# Patient Record
Sex: MALE | Race: WHITE | NOT HISPANIC OR LATINO | Employment: OTHER | URBAN - METROPOLITAN AREA
[De-identification: names, ages, dates, MRNs, and addresses within clinical notes are randomized per-mention and may not be internally consistent; named-entity substitution may affect disease eponyms.]

---

## 2017-01-31 ENCOUNTER — OFFICE VISIT (OUTPATIENT)
Dept: INTERNAL MEDICINE | Facility: CLINIC | Age: 62
End: 2017-01-31

## 2017-01-31 VITALS
HEIGHT: 72 IN | OXYGEN SATURATION: 98 % | WEIGHT: 198.8 LBS | SYSTOLIC BLOOD PRESSURE: 106 MMHG | DIASTOLIC BLOOD PRESSURE: 60 MMHG | RESPIRATION RATE: 12 BRPM | BODY MASS INDEX: 26.93 KG/M2 | HEART RATE: 60 BPM | TEMPERATURE: 98.6 F

## 2017-01-31 DIAGNOSIS — J30.9 ATOPIC RHINITIS: Primary | ICD-10-CM

## 2017-01-31 DIAGNOSIS — M72.2 PLANTAR FASCIITIS: ICD-10-CM

## 2017-01-31 DIAGNOSIS — J06.9 VIRAL URI: ICD-10-CM

## 2017-01-31 DIAGNOSIS — E66.9 ADIPOSITY: ICD-10-CM

## 2017-01-31 PROCEDURE — 99214 OFFICE O/P EST MOD 30 MIN: CPT | Performed by: INTERNAL MEDICINE

## 2017-01-31 PROCEDURE — G0439 PPPS, SUBSEQ VISIT: HCPCS | Performed by: INTERNAL MEDICINE

## 2017-01-31 RX ORDER — FLUTICASONE PROPIONATE 50 MCG
2 SPRAY, SUSPENSION (ML) NASAL DAILY
Qty: 3 BOTTLE | Refills: 3 | Status: SHIPPED | OUTPATIENT
Start: 2017-01-31 | End: 2017-10-26

## 2017-02-03 NOTE — PROGRESS NOTES
"Subjective   Otis Loredo is a 62 y.o. male.     History of Present Illness     The patient has a lifelong history of seasonal allergic rhinitis.  He has pink symptoms and the fall and uses Flonase on a regular basis.  He has had no recent cough or wheezing.  He does work on environmental control and feels he has much less mold and dust exposure.  He has frequent sinus headaches but has had no fever or sore throat.    The patient has a lifelong history of overweight and obesity.  Both parents were markedly obese.  He lost 70 pounds between 2011 2012 6 months ago he was still at 226 pounds.  He has wife are working with the dietitian to resolve high calorie foods at home.  He feels they've made excellent progress.  He is walking on a regular basis with new shoe inserts.    The following portions of the patient's history were reviewed and updated as appropriate: allergies, current medications, past family history, past medical history, past social history, past surgical history and problem list.    Review of Systems   Constitutional: Negative for appetite change and fatigue.   HENT: Positive for congestion and sinus pressure. Negative for ear pain and sore throat.    Respiratory: Negative for cough and shortness of breath.    Cardiovascular: Negative for chest pain and palpitations.   Gastrointestinal: Negative for abdominal pain and nausea.   Musculoskeletal: Positive for gait problem. Negative for arthralgias and back pain.        Persistent pain in bottom of feet.  Heal pain much improved with inserts   Neurological: Negative for dizziness and headaches.       Objective   Blood pressure 106/60, pulse 60, temperature 98.6 °F (37 °C), temperature source Oral, resp. rate 12, height 71.5\" (181.6 cm), weight 198 lb 12.8 oz (90.2 kg), SpO2 98 %.    Physical Exam   Constitutional: He is oriented to person, place, and time. He appears well-developed and well-nourished.   HENT:   Right Ear: External ear normal.   Left " Ear: External ear normal.   Mouth/Throat: Oropharynx is clear and moist.   Mild maxillary tenderness   Cardiovascular: Normal rate, regular rhythm and normal heart sounds.    Pulmonary/Chest: Effort normal and breath sounds normal. He has no wheezes. He has no rales.   Musculoskeletal: Normal range of motion. He exhibits no edema.   Moderate plantar fasciitis with flatfeet   Neurological: He is alert and oriented to person, place, and time. He exhibits normal muscle tone. Coordination normal.   Psychiatric: He has a normal mood and affect. His behavior is normal. Judgment and thought content normal.   Nursing note and vitals reviewed.    Procedures  Assessment/Plan   Diagnoses and all orders for this visit:    Atopic rhinitis    Adiposity    Plantar fasciitis    Viral URI    Other orders  -     fluticasone (FLONASE) 50 MCG/ACT nasal spray; 2 sprays into each nostril Daily.      The patient's obesity has significantly improved.  He has a 28 pound weight loss in the last 7 months.  I've asked him to plan on another 10 pound weight loss this year.    The patient's allergic rhinitis is reasonably well controlled on Flonase.  He should use nasal saline when he is more symptomatic.    The patient's had a moderate tobacco addiction throughout his life.  He is been off of tobacco now for 3 years using the electronic cigarette.  He is continually using low doses of nicotine.    The wellness exam has been reviewed in detail.  The patient has been fully counseled on preventative guidelines for vaccines, cancer screenings, and other health maintenance needs.  Functional testing has been performed to assess capacity for independent living and need for other medical interventions.   The patient was counseled on maintaining a lifestyle to promote good health and to minimize chronic diseases.  The patient has been assisted with scheduling healthcare procedures for the coming year and given a written document outlining these  recommendations.    Patient Instructions   1.  Continue same medications and supplements - as listed.    2.  Use plain Mucinex twice daily for 1 week - to clear congestion.    3.  Use some nasal saline every morning - to clear nasal passages.    4.  Continue usual medicines and supplements - as listed.    5.  Follow a low-calorie diet - for steady weight loss.    6.  One year goal weight - 185 pounds.    7.  Return in 4 months - annual checkup fasting.    Electronically signed Randall Tipton M.D.2/2/2017 7:05 PM

## 2017-05-25 ENCOUNTER — APPOINTMENT (OUTPATIENT)
Dept: LAB | Facility: HOSPITAL | Age: 62
End: 2017-05-25

## 2017-05-25 ENCOUNTER — OFFICE VISIT (OUTPATIENT)
Dept: INTERNAL MEDICINE | Facility: CLINIC | Age: 62
End: 2017-05-25

## 2017-05-25 VITALS
DIASTOLIC BLOOD PRESSURE: 70 MMHG | RESPIRATION RATE: 16 BRPM | HEIGHT: 71 IN | WEIGHT: 211 LBS | SYSTOLIC BLOOD PRESSURE: 106 MMHG | BODY MASS INDEX: 29.54 KG/M2 | HEART RATE: 64 BPM | TEMPERATURE: 98.1 F | OXYGEN SATURATION: 98 %

## 2017-05-25 DIAGNOSIS — M72.2 PLANTAR FASCIITIS: ICD-10-CM

## 2017-05-25 DIAGNOSIS — N42.9 DISORDER OF PROSTATE: ICD-10-CM

## 2017-05-25 DIAGNOSIS — M21.42 PES PLANUS OF BOTH FEET: ICD-10-CM

## 2017-05-25 DIAGNOSIS — K64.0 FIRST DEGREE HEMORRHOIDS: Primary | ICD-10-CM

## 2017-05-25 DIAGNOSIS — Z82.49 FAM HX-ISCHEM HEART DISEASE: ICD-10-CM

## 2017-05-25 DIAGNOSIS — J30.9 ATOPIC RHINITIS: ICD-10-CM

## 2017-05-25 DIAGNOSIS — E66.9 ADIPOSITY: ICD-10-CM

## 2017-05-25 DIAGNOSIS — H93.13 TINNITUS OF BOTH EARS: ICD-10-CM

## 2017-05-25 DIAGNOSIS — M21.41 PES PLANUS OF BOTH FEET: ICD-10-CM

## 2017-05-25 LAB
ALBUMIN SERPL-MCNC: 4.3 G/DL (ref 3.2–4.8)
ALBUMIN/GLOB SERPL: 1.5 G/DL (ref 1.5–2.5)
ALP SERPL-CCNC: 77 U/L (ref 25–100)
ALT SERPL W P-5'-P-CCNC: 16 U/L (ref 7–40)
ANION GAP SERPL CALCULATED.3IONS-SCNC: 1 MMOL/L (ref 3–11)
AST SERPL-CCNC: 27 U/L (ref 0–33)
BACTERIA UR QL AUTO: ABNORMAL /HPF
BASOPHILS # BLD AUTO: 0.02 10*3/MM3 (ref 0–0.2)
BASOPHILS NFR BLD AUTO: 0.4 % (ref 0–1)
BILIRUB SERPL-MCNC: 0.6 MG/DL (ref 0.3–1.2)
BILIRUB UR QL STRIP: NEGATIVE
BUN BLD-MCNC: 8 MG/DL (ref 9–23)
BUN/CREAT SERPL: 10 (ref 7–25)
CALCIUM SPEC-SCNC: 9.1 MG/DL (ref 8.7–10.4)
CHLORIDE SERPL-SCNC: 107 MMOL/L (ref 99–109)
CLARITY UR: CLEAR
CO2 SERPL-SCNC: 33 MMOL/L (ref 20–31)
COLOR UR: YELLOW
CREAT BLD-MCNC: 0.8 MG/DL (ref 0.6–1.3)
CRP SERPL-MCNC: 0.07 MG/DL (ref 0–1)
DEPRECATED RDW RBC AUTO: 46.9 FL (ref 37–54)
EOSINOPHIL # BLD AUTO: 0.18 10*3/MM3 (ref 0.1–0.3)
EOSINOPHIL NFR BLD AUTO: 3.9 % (ref 0–3)
ERYTHROCYTE [DISTWIDTH] IN BLOOD BY AUTOMATED COUNT: 12.9 % (ref 11.3–14.5)
GFR SERPL CREATININE-BSD FRML MDRD: 99 ML/MIN/1.73
GLOBULIN UR ELPH-MCNC: 2.8 GM/DL
GLUCOSE BLD-MCNC: 95 MG/DL (ref 70–100)
GLUCOSE UR STRIP-MCNC: NEGATIVE MG/DL
HCT VFR BLD AUTO: 41.3 % (ref 38.9–50.9)
HGB BLD-MCNC: 14.1 G/DL (ref 13.1–17.5)
HGB UR QL STRIP.AUTO: NEGATIVE
HYALINE CASTS UR QL AUTO: ABNORMAL /LPF
IMM GRANULOCYTES # BLD: 0.01 10*3/MM3 (ref 0–0.03)
IMM GRANULOCYTES NFR BLD: 0.2 % (ref 0–0.6)
KETONES UR QL STRIP: NEGATIVE
LEUKOCYTE ESTERASE UR QL STRIP.AUTO: ABNORMAL
LYMPHOCYTES # BLD AUTO: 2 10*3/MM3 (ref 0.6–4.8)
LYMPHOCYTES NFR BLD AUTO: 43.9 % (ref 24–44)
MCH RBC QN AUTO: 33.7 PG (ref 27–31)
MCHC RBC AUTO-ENTMCNC: 34.1 G/DL (ref 32–36)
MCV RBC AUTO: 98.8 FL (ref 80–99)
MONOCYTES # BLD AUTO: 0.38 10*3/MM3 (ref 0–1)
MONOCYTES NFR BLD AUTO: 8.3 % (ref 0–12)
NEUTROPHILS # BLD AUTO: 1.97 10*3/MM3 (ref 1.5–8.3)
NEUTROPHILS NFR BLD AUTO: 43.3 % (ref 41–71)
NITRITE UR QL STRIP: NEGATIVE
PH UR STRIP.AUTO: 5.5 [PH] (ref 5–8)
PLATELET # BLD AUTO: 255 10*3/MM3 (ref 150–450)
PMV BLD AUTO: 10 FL (ref 6–12)
POTASSIUM BLD-SCNC: 4.2 MMOL/L (ref 3.5–5.5)
PROT SERPL-MCNC: 7.1 G/DL (ref 5.7–8.2)
PROT UR QL STRIP: NEGATIVE
PSA SERPL-MCNC: 2.49 NG/ML (ref 0–4)
RBC # BLD AUTO: 4.18 10*6/MM3 (ref 4.2–5.76)
RBC # UR: ABNORMAL /HPF
REF LAB TEST METHOD: ABNORMAL
SODIUM BLD-SCNC: 141 MMOL/L (ref 132–146)
SP GR UR STRIP: 1.02 (ref 1–1.03)
SQUAMOUS #/AREA URNS HPF: ABNORMAL /HPF
UROBILINOGEN UR QL STRIP: ABNORMAL
WBC NRBC COR # BLD: 4.56 10*3/MM3 (ref 3.5–10.8)
WBC UR QL AUTO: ABNORMAL /HPF

## 2017-05-25 PROCEDURE — 36415 COLL VENOUS BLD VENIPUNCTURE: CPT | Performed by: INTERNAL MEDICINE

## 2017-05-25 PROCEDURE — 86140 C-REACTIVE PROTEIN: CPT | Performed by: INTERNAL MEDICINE

## 2017-05-25 PROCEDURE — 81001 URINALYSIS AUTO W/SCOPE: CPT | Performed by: INTERNAL MEDICINE

## 2017-05-25 PROCEDURE — 84153 ASSAY OF PSA TOTAL: CPT | Performed by: INTERNAL MEDICINE

## 2017-05-25 PROCEDURE — 80053 COMPREHEN METABOLIC PANEL: CPT | Performed by: INTERNAL MEDICINE

## 2017-05-25 PROCEDURE — 93000 ELECTROCARDIOGRAM COMPLETE: CPT | Performed by: INTERNAL MEDICINE

## 2017-05-25 PROCEDURE — 99215 OFFICE O/P EST HI 40 MIN: CPT | Performed by: INTERNAL MEDICINE

## 2017-05-25 PROCEDURE — 85025 COMPLETE CBC W/AUTO DIFF WBC: CPT | Performed by: INTERNAL MEDICINE

## 2017-06-01 ENCOUNTER — TELEPHONE (OUTPATIENT)
Dept: INTERNAL MEDICINE | Facility: CLINIC | Age: 62
End: 2017-06-01

## 2017-06-01 DIAGNOSIS — N42.9 DISORDER OF PROSTATE: Primary | ICD-10-CM

## 2017-06-01 RX ORDER — SULFAMETHOXAZOLE AND TRIMETHOPRIM 800; 160 MG/1; MG/1
1 TABLET ORAL 2 TIMES DAILY
Qty: 20 TABLET | Refills: 0 | Status: SHIPPED | OUTPATIENT
Start: 2017-06-01 | End: 2017-10-26

## 2017-06-01 NOTE — PROGRESS NOTES
The patient has a normal PSA at 2.5.  His last 3 PSAs have been 0.6 or lower.  He does have sick to 12 WBCs in the urine.  Start Bactrim DS every 12 hours for 10 days.  Repeat PSA in 2 weeks.

## 2017-06-01 NOTE — TELEPHONE ENCOUNTER
Pt notified of labs - UA shows 6-12 WBCs in urine; PSA 2.490  (was 0.40); recommend to take Bactrim DS bid X 10 days and repeat PSA in 2 wks; other labs OK per TGF

## 2017-10-26 ENCOUNTER — OFFICE VISIT (OUTPATIENT)
Dept: INTERNAL MEDICINE | Facility: CLINIC | Age: 62
End: 2017-10-26

## 2017-10-26 ENCOUNTER — APPOINTMENT (OUTPATIENT)
Dept: LAB | Facility: HOSPITAL | Age: 62
End: 2017-10-26

## 2017-10-26 VITALS
HEART RATE: 72 BPM | RESPIRATION RATE: 16 BRPM | TEMPERATURE: 98.6 F | BODY MASS INDEX: 31.24 KG/M2 | WEIGHT: 224 LBS | OXYGEN SATURATION: 97 % | SYSTOLIC BLOOD PRESSURE: 110 MMHG | DIASTOLIC BLOOD PRESSURE: 80 MMHG

## 2017-10-26 DIAGNOSIS — N42.9 DISORDER OF PROSTATE: ICD-10-CM

## 2017-10-26 DIAGNOSIS — M54.42 ACUTE BILATERAL LOW BACK PAIN WITH LEFT-SIDED SCIATICA: ICD-10-CM

## 2017-10-26 DIAGNOSIS — J30.89 CHRONIC NON-SEASONAL ALLERGIC RHINITIS, UNSPECIFIED TRIGGER: Primary | ICD-10-CM

## 2017-10-26 DIAGNOSIS — Z00.00 PREVENTATIVE HEALTH CARE: ICD-10-CM

## 2017-10-26 PROBLEM — M54.50 LOW BACK PAIN: Status: ACTIVE | Noted: 2017-10-26

## 2017-10-26 LAB
BILIRUB UR QL STRIP: NEGATIVE
CLARITY UR: CLEAR
COLOR UR: YELLOW
GLUCOSE UR STRIP-MCNC: NEGATIVE MG/DL
HGB UR QL STRIP.AUTO: NEGATIVE
KETONES UR QL STRIP: NEGATIVE
LEUKOCYTE ESTERASE UR QL STRIP.AUTO: NEGATIVE
NITRITE UR QL STRIP: NEGATIVE
PH UR STRIP.AUTO: 6 [PH] (ref 5–8)
PROT UR QL STRIP: NEGATIVE
PSA SERPL-MCNC: 0.51 NG/ML (ref 0–4)
SP GR UR STRIP: 1.01 (ref 1–1.03)
UROBILINOGEN UR QL STRIP: NORMAL

## 2017-10-26 PROCEDURE — 81003 URINALYSIS AUTO W/O SCOPE: CPT | Performed by: INTERNAL MEDICINE

## 2017-10-26 PROCEDURE — 90686 IIV4 VACC NO PRSV 0.5 ML IM: CPT | Performed by: INTERNAL MEDICINE

## 2017-10-26 PROCEDURE — 36415 COLL VENOUS BLD VENIPUNCTURE: CPT | Performed by: INTERNAL MEDICINE

## 2017-10-26 PROCEDURE — 99214 OFFICE O/P EST MOD 30 MIN: CPT | Performed by: INTERNAL MEDICINE

## 2017-10-26 PROCEDURE — 84153 ASSAY OF PSA TOTAL: CPT | Performed by: INTERNAL MEDICINE

## 2017-10-26 PROCEDURE — G0008 ADMIN INFLUENZA VIRUS VAC: HCPCS | Performed by: INTERNAL MEDICINE

## 2017-10-26 RX ORDER — FLUTICASONE PROPIONATE 50 MCG
2 SPRAY, SUSPENSION (ML) NASAL DAILY PRN
Qty: 3 BOTTLE | Refills: 3 | Status: SHIPPED | OUTPATIENT
Start: 2017-10-26 | End: 2018-06-09 | Stop reason: SDUPTHER

## 2017-10-26 NOTE — PROGRESS NOTES
Subjective   Otis Loredo is a 62 y.o. male.     History of Present Illness     The patient has many years of intermittent low back pain.  He has been more cautious stool for excessive standing and lifting in recent years.  Over the last 2 weeks he has had a marked increase in left low backache with left sciatica.  He has been standing more than usual working at his [employer].  He has taken no medications.  He has used an over-the-counter patch with limited benefit.    The following portions of the patient's history were reviewed and updated as appropriate: allergies, current medications, past family history, past medical history, past social history, past surgical history and problem list.    Review of Systems   Constitutional: Negative for appetite change and fatigue.   HENT: Negative for ear pain and sore throat.    Respiratory: Negative for cough and shortness of breath.    Cardiovascular: Negative for chest pain and palpitations.   Gastrointestinal: Negative for abdominal pain and nausea.   Genitourinary: Negative for dysuria and hematuria.   Musculoskeletal: Positive for back pain and gait problem. Negative for arthralgias.        Sciatica and feet pain.   Neurological: Negative for dizziness and headaches.       Objective   Blood pressure 110/80, pulse 72, temperature 98.6 °F (37 °C), temperature source Oral, resp. rate 16, weight 224 lb (102 kg), SpO2 97 %.    Physical Exam   Constitutional: He is oriented to person, place, and time. He appears well-developed and well-nourished. No distress.   Cardiovascular: Normal rate, regular rhythm and normal heart sounds.    Pulmonary/Chest: Effort normal and breath sounds normal. He has no wheezes. He has no rales.   Musculoskeletal:   Moderate paralumbar tightness tenderness.  Hips and knees good range of motion with moderate stiffness.  Straight leg raises 60° bilaterally with no back pain.  Gait is within normal limits.   Neurological: He is alert and oriented to  person, place, and time. He exhibits normal muscle tone. Coordination normal.   Psychiatric: He has a normal mood and affect. His behavior is normal. Judgment and thought content normal.   Nursing note and vitals reviewed.    Procedures  Assessment/Plan   Otis was seen today for back pain.    Diagnoses and all orders for this visit:    Chronic non-seasonal allergic rhinitis, unspecified trigger    Preventative health care    Acute bilateral low back pain with left-sided sciatica  -     XR Spine Lumbar 2 or 3 View    Disorder of prostate  -     PSA  -     Urinalysis With / Culture If Indicated - Urine, Clean Catch    Other orders  -     Flu Vaccine Quad PF 3YR+ (FLUARIX/FLUZONE [id number])  -     fluticasone (FLONASE) 50 MCG/ACT nasal spray; 2 sprays into each nostril Daily As Needed.    The patient's low back x-ray is pending at the time of this dictation.  He appears to have a chronic spondylosis and has likely fatigue is back from excess standing.  I've described back rehabilitation exercises and asked him to be consistent every day.     Patient's PSA elevated this spring to 2.4 from a previous PSA of 0.4.  The PSA is again dropped to less than 1.  His urinalysis is normal.  He likely had transient prostatitis.    Patient has chronic allergic rhinitis and moderate congestion today.  I've counseled him on routine use of nasal saline and Flonase.    Patient Instructions   1.  X-ray of low back - now.    2.  Low back exercises morning and night - over the next month - call the office with status report.    3.  Avoid excessive bending and standing - protect the back.    4.  Return visit January - fasting checkup.    5.  Urinalysis and PSA are fully normal.    Electronically signed Randall Tipton M.D.10/28/2017 3:19 PM

## 2017-10-26 NOTE — PATIENT INSTRUCTIONS
1.  X-ray of low back - now.    2.  Low back exercises morning and night - over the next month - call the office with status report.    3.  Avoid excessive bending and standing - protect the back.    4.  Return visit January - fasting checkup.

## 2017-11-01 ENCOUNTER — TELEPHONE (OUTPATIENT)
Dept: INTERNAL MEDICINE | Facility: CLINIC | Age: 62
End: 2017-11-01

## 2018-05-30 ENCOUNTER — APPOINTMENT (OUTPATIENT)
Dept: LAB | Facility: HOSPITAL | Age: 63
End: 2018-05-30

## 2018-05-30 ENCOUNTER — OFFICE VISIT (OUTPATIENT)
Dept: INTERNAL MEDICINE | Facility: CLINIC | Age: 63
End: 2018-05-30

## 2018-05-30 VITALS
HEIGHT: 72 IN | TEMPERATURE: 97.6 F | HEART RATE: 64 BPM | DIASTOLIC BLOOD PRESSURE: 70 MMHG | WEIGHT: 244 LBS | OXYGEN SATURATION: 97 % | SYSTOLIC BLOOD PRESSURE: 116 MMHG | BODY MASS INDEX: 33.05 KG/M2 | RESPIRATION RATE: 16 BRPM

## 2018-05-30 DIAGNOSIS — Z82.49 FAM HX-ISCHEM HEART DISEASE: ICD-10-CM

## 2018-05-30 DIAGNOSIS — Z11.59 ENCOUNTER FOR HEPATITIS C SCREENING TEST FOR LOW RISK PATIENT: ICD-10-CM

## 2018-05-30 DIAGNOSIS — I49.8 OTHER SPECIFIED CARDIAC ARRHYTHMIAS (CODE): ICD-10-CM

## 2018-05-30 DIAGNOSIS — Z12.5 ENCOUNTER FOR SCREENING FOR MALIGNANT NEOPLASM OF PROSTATE: ICD-10-CM

## 2018-05-30 DIAGNOSIS — N42.9 DISORDER OF PROSTATE: ICD-10-CM

## 2018-05-30 DIAGNOSIS — R00.2 PALPITATIONS: ICD-10-CM

## 2018-05-30 DIAGNOSIS — Z13.21 ENCOUNTER FOR VITAMIN DEFICIENCY SCREENING: ICD-10-CM

## 2018-05-30 DIAGNOSIS — E66.09 CLASS 1 OBESITY DUE TO EXCESS CALORIES WITH SERIOUS COMORBIDITY AND BODY MASS INDEX (BMI) OF 33.0 TO 33.9 IN ADULT: Primary | ICD-10-CM

## 2018-05-30 DIAGNOSIS — E55.9 VITAMIN D DEFICIENCY: ICD-10-CM

## 2018-05-30 DIAGNOSIS — Z00.00 PREVENTATIVE HEALTH CARE: ICD-10-CM

## 2018-05-30 LAB
25(OH)D3 SERPL-MCNC: 44 NG/ML
ALBUMIN SERPL-MCNC: 4.4 G/DL (ref 3.2–4.8)
ALBUMIN/GLOB SERPL: 1.6 G/DL (ref 1.5–2.5)
ALP SERPL-CCNC: 86 U/L (ref 25–100)
ALT SERPL W P-5'-P-CCNC: 16 U/L (ref 7–40)
ANION GAP SERPL CALCULATED.3IONS-SCNC: 2 MMOL/L (ref 3–11)
ARTICHOKE IGE QN: 92 MG/DL (ref 0–130)
AST SERPL-CCNC: 24 U/L (ref 0–33)
BACTERIA UR QL AUTO: ABNORMAL /HPF
BASOPHILS # BLD AUTO: 0.03 10*3/MM3 (ref 0–0.2)
BASOPHILS NFR BLD AUTO: 0.5 % (ref 0–1)
BILIRUB SERPL-MCNC: 0.6 MG/DL (ref 0.3–1.2)
BILIRUB UR QL STRIP: NEGATIVE
BUN BLD-MCNC: 8 MG/DL (ref 9–23)
BUN/CREAT SERPL: 10 (ref 7–25)
CALCIUM SPEC-SCNC: 8.9 MG/DL (ref 8.7–10.4)
CHLORIDE SERPL-SCNC: 111 MMOL/L (ref 99–109)
CHOLEST SERPL-MCNC: 154 MG/DL (ref 0–200)
CLARITY UR: CLEAR
CO2 SERPL-SCNC: 30 MMOL/L (ref 20–31)
COLOR UR: YELLOW
CREAT BLD-MCNC: 0.8 MG/DL (ref 0.6–1.3)
CRP SERPL-MCNC: 0.1 MG/DL (ref 0–1)
DEPRECATED RDW RBC AUTO: 47.2 FL (ref 37–54)
EOSINOPHIL # BLD AUTO: 0.24 10*3/MM3 (ref 0–0.3)
EOSINOPHIL NFR BLD AUTO: 3.8 % (ref 0–3)
ERYTHROCYTE [DISTWIDTH] IN BLOOD BY AUTOMATED COUNT: 13.2 % (ref 11.3–14.5)
GFR SERPL CREATININE-BSD FRML MDRD: 98 ML/MIN/1.73
GLOBULIN UR ELPH-MCNC: 2.7 GM/DL
GLUCOSE BLD-MCNC: 95 MG/DL (ref 70–100)
GLUCOSE UR STRIP-MCNC: NEGATIVE MG/DL
HCT VFR BLD AUTO: 42 % (ref 38.9–50.9)
HCV AB SER DONR QL: NORMAL
HDLC SERPL-MCNC: 51 MG/DL (ref 40–60)
HGB BLD-MCNC: 14 G/DL (ref 13.1–17.5)
HGB UR QL STRIP.AUTO: NEGATIVE
HYALINE CASTS UR QL AUTO: ABNORMAL /LPF
IMM GRANULOCYTES # BLD: 0.01 10*3/MM3 (ref 0–0.03)
IMM GRANULOCYTES NFR BLD: 0.2 % (ref 0–0.6)
KETONES UR QL STRIP: NEGATIVE
LEUKOCYTE ESTERASE UR QL STRIP.AUTO: ABNORMAL
LYMPHOCYTES # BLD AUTO: 2.11 10*3/MM3 (ref 0.6–4.8)
LYMPHOCYTES NFR BLD AUTO: 33.8 % (ref 24–44)
MCH RBC QN AUTO: 32.4 PG (ref 27–31)
MCHC RBC AUTO-ENTMCNC: 33.3 G/DL (ref 32–36)
MCV RBC AUTO: 97.2 FL (ref 80–99)
MONOCYTES # BLD AUTO: 0.56 10*3/MM3 (ref 0–1)
MONOCYTES NFR BLD AUTO: 9 % (ref 0–12)
NEUTROPHILS # BLD AUTO: 3.31 10*3/MM3 (ref 1.5–8.3)
NEUTROPHILS NFR BLD AUTO: 52.9 % (ref 41–71)
NITRITE UR QL STRIP: NEGATIVE
PH UR STRIP.AUTO: <=5 [PH] (ref 5–8)
PLATELET # BLD AUTO: 242 10*3/MM3 (ref 150–450)
PMV BLD AUTO: 10.8 FL (ref 6–12)
POTASSIUM BLD-SCNC: 4.3 MMOL/L (ref 3.5–5.5)
PROT SERPL-MCNC: 7.1 G/DL (ref 5.7–8.2)
PROT UR QL STRIP: NEGATIVE
PSA SERPL-MCNC: 1.31 NG/ML (ref 0–4)
RBC # BLD AUTO: 4.32 10*6/MM3 (ref 4.2–5.76)
RBC # UR: ABNORMAL /HPF
REF LAB TEST METHOD: ABNORMAL
SODIUM BLD-SCNC: 143 MMOL/L (ref 132–146)
SP GR UR STRIP: 1.02 (ref 1–1.03)
SQUAMOUS #/AREA URNS HPF: ABNORMAL /HPF
TRIGL SERPL-MCNC: 92 MG/DL (ref 0–150)
TSH SERPL DL<=0.05 MIU/L-ACNC: 1.23 MIU/ML (ref 0.35–5.35)
UROBILINOGEN UR QL STRIP: ABNORMAL
VIT B12 BLD-MCNC: 394 PG/ML (ref 211–911)
WBC NRBC COR # BLD: 6.25 10*3/MM3 (ref 3.5–10.8)
WBC UR QL AUTO: ABNORMAL /HPF

## 2018-05-30 PROCEDURE — 80053 COMPREHEN METABOLIC PANEL: CPT | Performed by: INTERNAL MEDICINE

## 2018-05-30 PROCEDURE — G0103 PSA SCREENING: HCPCS | Performed by: INTERNAL MEDICINE

## 2018-05-30 PROCEDURE — 82607 VITAMIN B-12: CPT | Performed by: INTERNAL MEDICINE

## 2018-05-30 PROCEDURE — 93000 ELECTROCARDIOGRAM COMPLETE: CPT | Performed by: INTERNAL MEDICINE

## 2018-05-30 PROCEDURE — 99214 OFFICE O/P EST MOD 30 MIN: CPT | Performed by: INTERNAL MEDICINE

## 2018-05-30 PROCEDURE — 81001 URINALYSIS AUTO W/SCOPE: CPT | Performed by: INTERNAL MEDICINE

## 2018-05-30 PROCEDURE — 84443 ASSAY THYROID STIM HORMONE: CPT | Performed by: INTERNAL MEDICINE

## 2018-05-30 PROCEDURE — 86803 HEPATITIS C AB TEST: CPT | Performed by: INTERNAL MEDICINE

## 2018-05-30 PROCEDURE — 86140 C-REACTIVE PROTEIN: CPT | Performed by: INTERNAL MEDICINE

## 2018-05-30 PROCEDURE — 85025 COMPLETE CBC W/AUTO DIFF WBC: CPT | Performed by: INTERNAL MEDICINE

## 2018-05-30 PROCEDURE — 36415 COLL VENOUS BLD VENIPUNCTURE: CPT | Performed by: INTERNAL MEDICINE

## 2018-05-30 PROCEDURE — 82306 VITAMIN D 25 HYDROXY: CPT | Performed by: INTERNAL MEDICINE

## 2018-05-30 PROCEDURE — 80061 LIPID PANEL: CPT | Performed by: INTERNAL MEDICINE

## 2018-05-30 PROCEDURE — G0439 PPPS, SUBSEQ VISIT: HCPCS | Performed by: INTERNAL MEDICINE

## 2018-05-30 NOTE — PROGRESS NOTES
Subjective   Otis Loredo is a 63 y.o. male.     Chief Complaint   Patient presents with   • Annual Exam   • Obesity       History of Present Illness     The patient's had an adult history of obesity with severe obesity and both parents.  5 years ago his body weight was 214 pounds.  With a disciplined diet he brought his body weight below 200 bilaterally 2017.  He is much more homebound in the last year because of his wife's disability with a back injury.  He admits to being less focused on diet and generally less active.  Family history is also complicated by heart disease, hypertension, and diabetes.    The following portions of the patient's history were reviewed and updated as appropriate: allergies, current medications, past family history, past medical history, past social history, past surgical history and problem list.    Active Ambulatory Problems     Diagnosis Date Noted   • Atopic rhinitis 05/24/2016   • Pes planus 05/24/2016   • Hemorrhoids 05/24/2016   • Adiposity 05/24/2016   • Disorder of prostate 05/24/2016   • Fam hx-ischem heart disease 05/24/2016   • Preventative health care 05/24/2016   • Plantar fasciitis 05/24/2016   • Tinnitus of both ears 09/27/2016   • Low back pain 10/26/2017   • Palpitations 05/30/2018     Resolved Ambulatory Problems     Diagnosis Date Noted   • Acute bacterial conjunctivitis of left eye 05/24/2016   • Vitamin D deficiency 05/25/2016   • Hyperglycemia 05/25/2016     Past Medical History:   Diagnosis Date   • Allergic rhinitis Lifelong   • CTS (carpal tunnel syndrome) 2005   • Flat foot Adulthood   • Hemorrhoids 2013   • Obesity 2015   • Plantar fasciitis 2015   • Scarlet fever 1962   • Varicose veins of both lower extremities Adulthood     Past Surgical History:   Procedure Laterality Date   • APPENDECTOMY  1975   • CARPAL TUNNEL RELEASE Bilateral 2005     Dr. Pacheco   • CHOLECYSTECTOMY  1975   • TONSILLECTOMY  1965     Family History   Problem Relation Age of  Onset   • Heart attack Mother    • Arthritis Mother    • Breast cancer Mother    • Hypertension Mother    • Obesity Mother         Morbid   • Stroke Mother    • Sick sinus syndrome Mother         Pacemaker   • Pneumonia Mother          age 79   • Allergies Father    • Alzheimer's disease Father          age 84   • Obesity Father         Morbid   • Valvular heart disease Father    • Diabetes Sister    • Gout Sister    • Obesity Sister         Morbid   • Allergies Son    • Hyperlipidemia Son    • No Known Problems Brother    • No Known Problems Sister      Social History     Social History   • Marital status:      Spouse name: N/A   • Number of children: N/A   • Years of education: N/A     Occupational History   • Not on file.     Social History Main Topics   • Smoking status: Former Smoker     Packs/day: 1.00     Years: 42.00     Types: Cigarettes     Start date:      Quit date:    • Smokeless tobacco: Never Used      Comment: Uses electronic cigarette   • Alcohol use No   • Drug use: No   • Sexual activity: Not on file     Other Topics Concern   • Not on file     Social History Narrative    Domestic life    lives in private home - with wife -  - who has severe back disability        [employer]   [employer]        Sleep hygiene    in bed 11 PM to 6 AM - 7 broken hours sleep        Caffeine use    5 cups half-and-half coffee daily        Exercise habits   walks 1 hour 7 days weekly        Diet     low-calorie American Heart Association diet - poultry  once weekly.        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   HENT: Positive for congestion. Negative for ear pain and sore throat.    Eyes: Negative for itching and visual disturbance.   Respiratory:  "Negative for cough and shortness of breath.    Cardiovascular: Positive for palpitations. Negative for chest pain.   Gastrointestinal: Negative for abdominal pain and nausea.   Endocrine: Negative for cold intolerance and heat intolerance.   Genitourinary: Negative for dysuria and hematuria.   Musculoskeletal: Positive for back pain. Negative for arthralgias and gait problem.   Skin: Negative for rash and wound.   Allergic/Immunologic: Negative for environmental allergies and food allergies.   Neurological: Negative for dizziness and headaches.   Hematological: Negative for adenopathy. Does not bruise/bleed easily.   Psychiatric/Behavioral: Positive for sleep disturbance. The patient is not nervous/anxious.         Moderate  broken sleep       Objective   Blood pressure 116/70, pulse 64, temperature 97.6 °F (36.4 °C), temperature source Oral, resp. rate 16, height 181.6 cm (71.5\"), weight 111 kg (244 lb), SpO2 97 %.    Physical Exam   Constitutional: He is oriented to person, place, and time. He appears well-developed and well-nourished. No distress.   HENT:   Right Ear: External ear normal.   Left Ear: External ear normal.   Mouth/Throat: Oropharynx is clear and moist.   Eyes: EOM are normal. Pupils are equal, round, and reactive to light. No scleral icterus.   Neck: Normal range of motion. Neck supple. No JVD present.   Cardiovascular: Normal rate, regular rhythm, normal heart sounds and intact distal pulses.    Pulmonary/Chest: Effort normal and breath sounds normal. He has no wheezes. He has no rales.   Abdominal: Soft. Bowel sounds are normal. There is no rebound and no guarding.   Genitourinary:   Genitourinary Comments: Deferred   Musculoskeletal: Normal range of motion. He exhibits no edema or tenderness.   Lymphadenopathy:     He has no cervical adenopathy.   Neurological: He is alert and oriented to person, place, and time. He displays normal reflexes. No cranial nerve deficit or sensory deficit. He " exhibits normal muscle tone. Coordination normal.   Vibratory normal except mildly depressed in toes  Romberg negative  Gait normal  Plantars downgoing     Skin: Skin is warm and dry. No rash noted.   Psychiatric: He has a normal mood and affect. His behavior is normal. Judgment and thought content normal.   Nursing note and vitals reviewed.      ECG 12 Lead  Date/Time: 5/30/2018 9:30 AM  Performed by: RANDALL TIPTON  Authorized by: RANDALL TIPTON   Interpreted by ED physician: Randall Tipton M.D.  Comparison: compared with previous ECG from 5/25/2017  Similar to previous ECG  Rhythm: sinus rhythm  Rate: normal  BPM: 30  Conduction: conduction normal  ST Segments: ST segments normal  T Waves: T waves normal  QRS axis: normal  Other: no other findings  Clinical impression: normal ECG  Comments: Indication - palpitations with family history heart disease  Baseline EKG          Assessment/Plan   Otis was seen today for annual exam and obesity.    Diagnoses and all orders for this visit:    Class 1 obesity due to excess calories with serious comorbidity and body mass index (BMI) of 33.0 to 33.9 in adult  -     CBC & Differential  -     C-reactive Protein  -     Urinalysis With / Microscopic If Indicated (No Culture) - Urine, Clean Catch  -     TSH  -     CBC Auto Differential  -     Urinalysis, Microscopic Only - Urine, Clean Catch; Future  -     Urinalysis, Microscopic Only - Urine, Clean Catch    Disorder of prostate  -     PSA Screen    Fam hx-ischem heart disease  -     ECG 12 Lead  -     Comprehensive Metabolic Panel  -     Lipid Panel    Preventative health care    Palpitations  -     Holter Monitor - 24 Hour    Encounter for vitamin deficiency screening  -     Vitamin D 25 Hydroxy  -     Vitamin B12    Vitamin D deficiency   -     Vitamin D 25 Hydroxy    Other specified cardiac arrhythmias (CODE)   -     TSH    Encounter for screening for malignant neoplasm of prostate   -     PSA Screen    Encounter  for hepatitis C screening test for low risk patient  -     Hepatitis C Antibody      Patient's obesity is more severe with a BMI over 33.  This will pose increasing problems with his chronic back pain, feet pain, and other long-term complications of obesity.  He and his wife would likely benefit from working with the dietitian if they will agree.    The patient has moderate varicose veins of his ankles with some areas of rash consistent with ringworm.  I've counseled him on treating the skin fungus and the irritability from varicose veins.    Patient has had persistent palpitations this year.  He is high risk for atrial fibrillation and should be screened with Holter monitoring.    The patient does have a lifelong history of cigarette smoking and is been off tobacco now for 4 years.  He has only mild findings of COPD and should be monitored expectantly.    The wellness exam has been reviewed in detail.  The patient has been fully counseled on preventative guidelines for vaccines, cancer screenings, and other health maintenance needs.  Functional testing has been performed to assess capacity for independent living and need for other medical interventions.   The patient was counseled on maintaining a lifestyle to promote good health and to minimize chronic diseases.  The patient has been assisted with scheduling healthcare procedures for the coming year and given a written document outlining these recommendations.    Patient Instructions   1.  Follow low calorie American heart association diet - lose 1 or 2 pounds every month.    2.  Walk 30-60 minutes every day - build physical fitness.    3.  Continue usual medicines and supplements - as listed.    4.  Take Metamucil 1 tablespoon every day - improve bowel health and hemorrhoids.    5.  Return 24-hour heart monitor tomorrow - for heart evaluation.    6.  Speak to nurse on Friday - about test results.    7.   Return visit November - fasting checkup.    8.  LDL  cholesterol acceptable at 92.  Continue American Heart Association guidelines.    9.  Urinalysis shows 5 red blood cells.  Maintain antibiotics if develop burning on urination.    10.  Other laboratory tests are acceptable and require no change in treatment.    11.  Consider work with registered dietitian and wife for a low-calorie weight-loss diet.    Current Outpatient Prescriptions:   •  aspirin (ASPIR-LOW) 81 MG EC tablet, Take 1 tablet by mouth daily., Disp: , Rfl:   •  fluticasone (FLONASE) 50 MCG/ACT nasal spray, 2 sprays into each nostril Daily As Needed., Disp: 3 bottle, Rfl: 3  •  Misc Natural Products (OSTEO BI-FLEX ADV DOUBLE ST) tablet, Take 1 tablet by mouth daily., Disp: , Rfl:   •  Multiple Vitamin tablet, Take 1 tablet by mouth daily., Disp: , Rfl:   •  Omega-3 Fatty Acids (FISH OIL) 600 MG capsule, Take  by mouth daily., Disp: , Rfl:   •  Psyllium Husk powder, Take 15 mL by mouth daily. Mix in 6 ounces of water or juice, Disp: , Rfl:   •  shark liver oil-cocoa butter (PREPARATION H) 0.25-3-85.5 % suppository, Preparation H 0.25-3-85.5 % Rectal Suppository; Patient Sig: Preparation H 0.25-3-85.5 % Rectal Suppository USE AS DIRECTED.  In the evening as needed; 0; 16-Apr-2014; Active, Disp: , Rfl:     Allergies   Allergen Reactions   • Nicoderm [Nicotine] Dermatitis       Immunization History   Administered Date(s) Administered   • Flu Vaccine Quad PF >36MO 10/26/2017   • Influenza, Quadrivalent 11/18/2015   • Tdap 10/01/2014   • influenza Split 09/27/2016     Electronically signed Randall Tipton M.D.5/30/2018 4:22 PM

## 2018-05-30 NOTE — PATIENT INSTRUCTIONS
1.  Follow low calorie American heart association diet - lose 1 or 2 pounds every month.    2.  Walk 30-60 minutes every day - build physical fitness.    3.  Continue usual medicines and supplements - as listed.    4.  Take Metamucil 1 tablespoon every day - improve bowel health and hemorrhoids.    5.  Return 24-hour heart monitor tomorrow - for heart evaluation.    6.  Speak to nurse on Friday - about test results.    7.   Return visit November - fasting checkup.

## 2018-05-30 NOTE — PROGRESS NOTES
QUICK REFERENCE INFORMATION:  The ABCs of the Annual Wellness Visit    Subsequent Medicare Wellness Visit    HEALTH RISK ASSESSMENT    1955    Recent Hospitalizations:  No hospitalization(s) within the last year..        Current Medical Providers:  Patient Care Team:  Randall Tipton MD as PCP - General        Smoking Status:  History   Smoking Status   • Former Smoker   • Packs/day: 1.00   • Years: 42.00   • Types: Cigarettes   • Start date: 1971   • Quit date: 2014   Smokeless Tobacco   • Never Used     Comment: Uses electronic cigarette       Alcohol Consumption:  History   Alcohol Use No       Depression Screen:   PHQ-2/PHQ-9 Depression Screening 5/30/2018   Little interest or pleasure in doing things 0   Feeling down, depressed, or hopeless 0   Trouble falling or staying asleep, or sleeping too much -   Feeling tired or having little energy -   Poor appetite or overeating -   Feeling bad about yourself - or that you are a failure or have let yourself or your family down -   Trouble concentrating on things, such as reading the newspaper or watching television -   Moving or speaking so slowly that other people could have noticed. Or the opposite - being so fidgety or restless that you have been moving around a lot more than usual -   Thoughts that you would be better off dead, or of hurting yourself in some way -   Total Score 0   If you checked off any problems, how difficult have these problems made it for you to do your work, take care of things at home, or get along with other people? -       Health Habits and Functional and Cognitive Screening:  Functional & Cognitive Status 2/2/2017   Do you have difficulty preparing food and eating? No   Do you have difficulty bathing yourself, getting dressed or grooming yourself? No   Do you have difficulty using the toilet? No   Do you have difficulty moving around from place to place? No   In the past year have you fallen or experienced a near fall? No   Do you  need help using the phone?  No   Are you deaf or do you have serious difficulty hearing?  No   Do you need help with transportation? No   Do you need help shopping? No   Do you need help preparing meals?  No   Do you need help with housework?  No   Do you need help with laundry? No   Do you need help taking your medications? No   Do you need help managing money? No   Do you have difficulty concentrating, remembering or making decisions? No           Does the patient have evidence of cognitive impairment? No    Aspirin use counseling: Taking ASA appropriately as indicated      Recent Lab Results:  CMP:  Lab Results   Component Value Date    BUN 8 (L) 05/30/2018    CREATININE 0.80 05/30/2018    EGFRIFNONA 98 05/30/2018    BCR 10.0 05/30/2018     05/30/2018    K 4.3 05/30/2018    CO2 30.0 05/30/2018    CALCIUM 8.9 05/30/2018    ALBUMIN 4.40 05/30/2018    LABIL2 1.6 05/30/2018    BILITOT 0.6 05/30/2018    ALKPHOS 86 05/30/2018    AST 24 05/30/2018    ALT 16 05/30/2018     Lipid Panel:  Lab Results   Component Value Date    CHOL 154 05/30/2018    TRIG 92 05/30/2018    HDL 51 05/30/2018     HbA1c:  Lab Results   Component Value Date    HGBA1C 5.1 05/24/2016       Visual Acuity:  No exam data present    Age-appropriate Screening Schedule:  Refer to the list below for future screening recommendations based on patient's age, sex and/or medical conditions. Orders for these recommended tests are listed in the plan section. The patient has been provided with a written plan.    Health Maintenance   Topic Date Due   • ZOSTER VACCINE (1 of 2) 01/10/2007   • COLONOSCOPY  05/24/2016   • INFLUENZA VACCINE  08/01/2018   • TDAP/TD VACCINES (2 - Td) 10/01/2024        Subjective   History of Present Illness    Otis Loredo is a 63 y.o. male who presents for an Subsequent Wellness Visit.    The following portions of the patient's history were reviewed and updated as appropriate: allergies, current medications, past family  "history, past medical history, past social history, past surgical history and problem list.    Outpatient Medications Prior to Visit   Medication Sig Dispense Refill   • aspirin (ASPIR-LOW) 81 MG EC tablet Take 1 tablet by mouth daily.     • fluticasone (FLONASE) 50 MCG/ACT nasal spray 2 sprays into each nostril Daily As Needed. 3 bottle 3   • Misc Natural Products (OSTEO BI-FLEX ADV DOUBLE ST) tablet Take 1 tablet by mouth daily.     • Multiple Vitamin tablet Take 1 tablet by mouth daily.     • Omega-3 Fatty Acids (FISH OIL) 600 MG capsule Take  by mouth daily.     • Psyllium Husk powder Take 15 mL by mouth daily. Mix in 6 ounces of water or juice     • shark liver oil-cocoa butter (PREPARATION H) 0.25-3-85.5 % suppository Preparation H 0.25-3-85.5 % Rectal Suppository; Patient Sig: Preparation H 0.25-3-85.5 % Rectal Suppository USE AS DIRECTED.  In the evening as needed; 0; 16-Apr-2014; Active       No facility-administered medications prior to visit.        Patient Active Problem List   Diagnosis   • Atopic rhinitis   • Pes planus   • Hemorrhoids   • Adiposity   • Disorder of prostate   • Fam hx-ischem heart disease   • Preventative health care   • Plantar fasciitis   • Tinnitus of both ears   • Low back pain   • Palpitations       Advance Care Planning:  has an advance directive - a copy HAS NOT been provided    Identification of Risk Factors:  Risk factors include: weight , unhealthy diet, cardiovascular risk and inactivity.    Review of Systems    Compared to one year ago, the patient feels his physical health is better.  Compared to one year ago, the patient feels his mental health is better.    Objective     Physical Exam    Vitals:    05/30/18 0911   BP: 116/70   BP Location: Left arm   Patient Position: Sitting   Pulse: 64  Comment: regular   Resp: 16  Comment: normal   Temp: 97.6 °F (36.4 °C)   TempSrc: Oral   SpO2: 97%  Comment: RA   Weight: 111 kg (244 lb)   Height: 181.6 cm (71.5\")       Patient's Body " mass index is 33.56 kg/m². BMI is above normal parameters. Recommendations include: educational material, exercise counseling and nutrition counseling.      Assessment/Plan   Patient Self-Management and Personalized Health Advice  The patient has been provided with information about: diet, exercise, weight management, prevention of cardiac or vascular disease and the relationship between weight and GERD and preventive services including:   · Alcohol use counseling completed, Diabetes screening, see lab orders, Exercise counseling provided, Fall Risk assessment done, Fall Risk plan of care done, Nutrition counseling provided, Prostate cancer screening discussed.    Visit Diagnoses:    ICD-10-CM ICD-9-CM   1. Class 1 obesity due to excess calories with serious comorbidity and body mass index (BMI) of 33.0 to 33.9 in adult E66.09 278.00    Z68.33 V85.33   2. Disorder of prostate N42.9 602.9   3. Fam hx-ischem heart disease Z82.49 V17.3   4. Preventative health care Z00.00 V70.0   5. Palpitations R00.2 785.1   6. Encounter for vitamin deficiency screening Z13.21 V77.99   7. Vitamin D deficiency  E55.9 268.9   8. Other specified cardiac arrhythmias (CODE)  I49.8 427.89   9. Encounter for screening for malignant neoplasm of prostate  Z12.5 V76.44   10. Encounter for hepatitis C screening test for low risk patient Z11.59 V73.89       Orders Placed This Encounter   Procedures   • Comprehensive Metabolic Panel   • C-reactive Protein   • Lipid Panel   • Vitamin D 25 Hydroxy   • Vitamin B12   • Urinalysis With / Microscopic If Indicated (No Culture) - Urine, Clean Catch   • TSH   • PSA Screen   • CBC Auto Differential   • Urinalysis, Microscopic Only - Urine, Clean Catch     Standing Status:   Future     Number of Occurrences:   1     Standing Expiration Date:   5/30/2019   • Hepatitis C Antibody     Blood drawn this morning   • Holter Monitor - 24 Hour     Order Specific Question:   Reason for exam?     Answer:   Palpitations    • ECG 12 Lead     Order Specific Question:   Reason for Exam:     Answer:   Palpitations   • CBC & Differential     Order Specific Question:   Manual Differential     Answer:   No       Outpatient Encounter Prescriptions as of 5/30/2018   Medication Sig Dispense Refill   • aspirin (ASPIR-LOW) 81 MG EC tablet Take 1 tablet by mouth daily.     • fluticasone (FLONASE) 50 MCG/ACT nasal spray 2 sprays into each nostril Daily As Needed. 3 bottle 3   • Misc Natural Products (OSTEO BI-FLEX ADV DOUBLE ST) tablet Take 1 tablet by mouth daily.     • Multiple Vitamin tablet Take 1 tablet by mouth daily.     • Omega-3 Fatty Acids (FISH OIL) 600 MG capsule Take  by mouth daily.     • Psyllium Husk powder Take 15 mL by mouth daily. Mix in 6 ounces of water or juice     • shark liver oil-cocoa butter (PREPARATION H) 0.25-3-85.5 % suppository Preparation H 0.25-3-85.5 % Rectal Suppository; Patient Sig: Preparation H 0.25-3-85.5 % Rectal Suppository USE AS DIRECTED.  In the evening as needed; 0; 16-Apr-2014; Active       No facility-administered encounter medications on file as of 5/30/2018.        Reviewed use of high risk medication in the elderly: yes  Reviewed for potential of harmful drug interactions in the elderly: yes    Follow Up:  Return in about 6 months (around 11/30/2018) for Fasting, Recheck.     An After Visit Summary and PPPS with all of these plans were given to the patient.        The wellness exam has been reviewed in detail.  The patient has been fully counseled on preventative guidelines for vaccines, cancer screenings, and other health maintenance needs.  Functional testing has been performed to assess capacity for independent living and need for other medical interventions.   The patient was counseled on maintaining a lifestyle to promote good health and to minimize chronic diseases.  The patient has been assisted with scheduling healthcare procedures for the coming year and given a written document outlining  these recommendations.    Electronically signed Randall Tipton M.D.5/30/2018 4:11 PM

## 2018-06-01 ENCOUNTER — TELEPHONE (OUTPATIENT)
Dept: INTERNAL MEDICINE | Facility: CLINIC | Age: 63
End: 2018-06-01

## 2018-06-01 NOTE — TELEPHONE ENCOUNTER
Wife Kate & pt called right back after VM left.  Msg repeated to her per  JOZEF.  Pt denies any dysuria.  He will call if he does.

## 2018-06-01 NOTE — TELEPHONE ENCOUNTER
Called labs to pt.  Left VM.   Per TGF:  LDL ok  92.  Continue aha diet  UA 5 red blood cells. Pt to call if he develops burning with urination  Other laboratory tests are acceptable and require no change in treatment.  Pt to call w questions

## 2018-06-05 ENCOUNTER — TELEPHONE (OUTPATIENT)
Dept: INTERNAL MEDICINE | Facility: CLINIC | Age: 63
End: 2018-06-05

## 2018-06-11 RX ORDER — FLUTICASONE PROPIONATE 50 MCG
SPRAY, SUSPENSION (ML) NASAL
Qty: 3 BOTTLE | Refills: 3 | Status: SHIPPED | OUTPATIENT
Start: 2018-06-11 | End: 2019-03-20 | Stop reason: SDUPTHER

## 2018-11-21 ENCOUNTER — OFFICE VISIT (OUTPATIENT)
Dept: FAMILY MEDICINE CLINIC | Facility: CLINIC | Age: 63
End: 2018-11-21

## 2018-11-21 VITALS
SYSTOLIC BLOOD PRESSURE: 114 MMHG | OXYGEN SATURATION: 98 % | HEART RATE: 68 BPM | HEIGHT: 72 IN | BODY MASS INDEX: 32.86 KG/M2 | DIASTOLIC BLOOD PRESSURE: 80 MMHG | WEIGHT: 242.6 LBS

## 2018-11-21 DIAGNOSIS — J30.89 NON-SEASONAL ALLERGIC RHINITIS, UNSPECIFIED TRIGGER: ICD-10-CM

## 2018-11-21 DIAGNOSIS — G62.9 PERIPHERAL POLYNEUROPATHY: ICD-10-CM

## 2018-11-21 DIAGNOSIS — E66.09 CLASS 1 OBESITY DUE TO EXCESS CALORIES WITH SERIOUS COMORBIDITY AND BODY MASS INDEX (BMI) OF 33.0 TO 33.9 IN ADULT: Primary | ICD-10-CM

## 2018-11-21 PROCEDURE — 99214 OFFICE O/P EST MOD 30 MIN: CPT | Performed by: FAMILY MEDICINE

## 2019-03-21 RX ORDER — FLUTICASONE PROPIONATE 50 MCG
1 SPRAY, SUSPENSION (ML) NASAL DAILY
Qty: 3 BOTTLE | Refills: 3 | Status: SHIPPED | OUTPATIENT
Start: 2019-03-21

## 2021-11-19 ENCOUNTER — OFFICE VISIT (OUTPATIENT)
Dept: FAMILY MEDICINE CLINIC | Facility: CLINIC | Age: 66
End: 2021-11-19

## 2021-11-19 VITALS
BODY MASS INDEX: 32.95 KG/M2 | SYSTOLIC BLOOD PRESSURE: 142 MMHG | WEIGHT: 265 LBS | HEIGHT: 75 IN | HEART RATE: 78 BPM | OXYGEN SATURATION: 98 % | DIASTOLIC BLOOD PRESSURE: 78 MMHG

## 2021-11-19 DIAGNOSIS — M79.672 BILATERAL FOOT PAIN: ICD-10-CM

## 2021-11-19 DIAGNOSIS — M25.50 POLYARTHRALGIA: ICD-10-CM

## 2021-11-19 DIAGNOSIS — Z12.5 SCREENING PSA (PROSTATE SPECIFIC ANTIGEN): ICD-10-CM

## 2021-11-19 DIAGNOSIS — M79.671 BILATERAL FOOT PAIN: ICD-10-CM

## 2021-11-19 DIAGNOSIS — Z00.00 PREVENTATIVE HEALTH CARE: ICD-10-CM

## 2021-11-19 DIAGNOSIS — M14.679 CHARCOT ARTHROPATHY OF MIDFOOT: ICD-10-CM

## 2021-11-19 DIAGNOSIS — G62.9 PERIPHERAL POLYNEUROPATHY: ICD-10-CM

## 2021-11-19 DIAGNOSIS — Z13.220 SCREENING FOR HYPERLIPIDEMIA: ICD-10-CM

## 2021-11-19 DIAGNOSIS — Z13.29 SCREENING FOR ENDOCRINE DISORDER: ICD-10-CM

## 2021-11-19 DIAGNOSIS — Z00.00 MEDICARE ANNUAL WELLNESS VISIT, SUBSEQUENT: Primary | ICD-10-CM

## 2021-11-19 DIAGNOSIS — Z13.0 SCREENING FOR DEFICIENCY ANEMIA: ICD-10-CM

## 2021-11-19 PROCEDURE — 1170F FXNL STATUS ASSESSED: CPT | Performed by: FAMILY MEDICINE

## 2021-11-19 PROCEDURE — G0439 PPPS, SUBSEQ VISIT: HCPCS | Performed by: FAMILY MEDICINE

## 2021-11-19 PROCEDURE — 1125F AMNT PAIN NOTED PAIN PRSNT: CPT | Performed by: FAMILY MEDICINE

## 2021-11-19 PROCEDURE — 1160F RVW MEDS BY RX/DR IN RCRD: CPT | Performed by: FAMILY MEDICINE

## 2021-11-19 PROCEDURE — 99396 PREV VISIT EST AGE 40-64: CPT | Performed by: FAMILY MEDICINE

## 2021-11-19 RX ORDER — DULOXETIN HYDROCHLORIDE 20 MG/1
20 CAPSULE, DELAYED RELEASE ORAL DAILY
Qty: 30 CAPSULE | Refills: 1 | Status: SHIPPED | OUTPATIENT
Start: 2021-11-19 | End: 2022-01-14 | Stop reason: DRUGHIGH

## 2021-11-19 NOTE — PATIENT INSTRUCTIONS
1.  Continue medications and supplements - as listed.    2.  Continue well-balanced diet - low in calories and low in added sugar.  -Plant-based diets have the best evidence for decreasing inflammation and chronic pain, as well as reducing the risk of heart disease and dementia.    3.  Maintain a routine exercise program - 30 minutes at least 3 days every week.  This is important even if you are active at your job.    4.  Blood work has been ordered for you today. You do not need an appointment.  If you are unable to have your labs done today, please return over the next few days to have them done.  The nurse will call you with results (or they will become available via [employer]) or they will be discussed at your next scheduled visit.  -Lab services are available downstairs or at any [facility] outpatient lab center, including across the street at [address] [place] [address] Dr  -Note: As of January 1, 2021, all lab results are automatically released to [employer] immediately when they return whether they have been reviewed or not. You may see your results show up on your [employer] without any context or comment. Please allow 1-2 business days after the labs have returned for them to be reviewed.

## 2021-11-19 NOTE — PROGRESS NOTES
"QUICK REFERENCE INFORMATION:  The ABCs of the Annual Wellness Visit    Medicare Subsequent Wellness Visit    Chief Complaint   Patient presents with   • Medicare Wellness-subsequent     pt declines flu shot today, pt not fasting for routine labs         HPI     Otis Loredo is a 66 y.o. male presenting for subsequent annual wellness visit.     Chronic health conditions:  Atopic rhinitis: Stable on Flonase seasonally  Obesity: His BMI has been maintained around 33 over the past 5+ years  Prostatism: Only mild symptoms  Plantar fasciitis: Compounded along with bilateral pes planus  Peripheral neuropathy: Unknown etiology, has been worsening over the past few years  Arthritis: He takes Osteo Bi-Flex daily    My  last visit with him was 11/21/2018, almost 3 full years ago. Prior that time, he was a patient at Dr. Tipton's office.    The patient reports that he is still having neuropathy in his feet. He has been seeing a podiatrist for a couple of years. He had an x-ray of his feet and was told that the bones in his feet are . He states that wearing braces is not helping. He has tried cloth braces that went up underneath his feet with straps that pull up. He states that he does not care to wear them up to his knees if they are going to work and allow him to continue walking. He reports that when the pain is severe, it also affects his knee. He states that he can feel his feet \" . \" He reports that he can feel the bone in the plantar aspect of his feet. He states that at this moment, it feels like someone is \"taking something and rubbing on his foot.\" He states that the foot is cramping as well. He states that it is just when he stands on them for any length of time. He states that he was prescribed a medication by Dr. Tipton for neuropathy, but he is not taking it due to the side effects. He states that he took it for a while. He adds he has arthritis in his ankles and in his feet.    The patient " "notes that things bother him more lately and thinks he needs medication to calm him down. He notes he is getting a little bit \"on edge.\" Additionally, he states that it is causing problems between he and his wife.     This patient is accompanied by their self who contributes to the history of their care.    Past Medical History:   Diagnosis Date   • Allergic seasonal   • Allergic rhinitis Lifelong   • Cholelithiasis    • CTS (carpal tunnel syndrome)     Improved with surgical repair   • Flat foot Adulthood    Bilateral- improved with orthotic inserts    • GERD (gastroesophageal reflux disease)    • Hemorrhoids    • HL (hearing loss)    • Low back pain    • Obesity     BMI 31 - body weight 225   • Plantar fasciitis     Severe symptoms relieved with new orthotics   • Scarlet fever    • Varicose veins of both lower extremities Adulthood    Ankle irritability      Past Surgical History:   Procedure Laterality Date   • APPENDECTOMY     • CARPAL TUNNEL RELEASE Bilateral      Dr. Pacheco   • CHOLECYSTECTOMY     • TONSILLECTOMY  1965     Family History   Problem Relation Age of Onset   • Heart attack Mother    • Arthritis Mother    • Breast cancer Mother    • Hypertension Mother    • Obesity Mother         Morbid   • Stroke Mother    • Sick sinus syndrome Mother         Pacemaker   • Pneumonia Mother          age 79   • Asthma Mother    • Cancer Mother    • Heart disease Mother    • Allergies Father    • Alzheimer's disease Father          age 84   • Obesity Father         Morbid   • Valvular heart disease Father    • Asthma Father    • Heart disease Father    • Diabetes Sister    • Gout Sister    • Obesity Sister         Morbid   • Allergies Son    • Hyperlipidemia Son    • No Known Problems Brother    • No Known Problems Sister       Social History     Socioeconomic History   • Marital status:    Tobacco Use   • Smoking status: Former Smoker     Packs/day: 1.00     Years: " "42.00     Pack years: 42.00     Types: Cigarettes     Start date:      Quit date:      Years since quittin.8   • Smokeless tobacco: Never Used   • Tobacco comment: Uses electronic cigarette   Substance and Sexual Activity   • Alcohol use: No   • Drug use: No   • Sexual activity: Yes     Partners: Female      Allergies   Allergen Reactions   • Nicoderm [Nicotine] Dermatitis      Outpatient Medications Prior to Visit   Medication Sig Dispense Refill   • aspirin (ASPIR-LOW) 81 MG EC tablet Take 1 tablet by mouth daily.     • fluticasone (FLONASE) 50 MCG/ACT nasal spray 1 spray into the nostril(s) as directed by provider Daily. 3 bottle 3   • Misc Natural Products (OSTEO BI-FLEX ADV DOUBLE ST) tablet Take 1 tablet by mouth daily.     • Multiple Vitamin tablet Take 1 tablet by mouth daily.     • Omega-3 Fatty Acids (FISH OIL) 600 MG capsule Take  by mouth daily.     • Psyllium Husk powder Take 15 mL by mouth daily. Mix in 6 ounces of water or juice     • shark liver oil-cocoa butter (PREPARATION H) 0.25-3-85.5 % suppository Preparation H 0.25-3-85.5 % Rectal Suppository; Patient Sig: Preparation H 0.25-3-85.5 % Rectal Suppository USE AS DIRECTED.  In the evening as needed; 0; -2014; Active       No facility-administered medications prior to visit.       Reviewed use of high risk medication in the elderly: yes  Reviewed for potential of harmful drug interactions in the elderly: yes    The following portions of the patient's history were reviewed and updated as appropriate: allergies, current medications, past family history, past medical history, past social history, past surgical history and problem list.    Review of Systems   Review of Systems -  See Annual Exam ROS scanned into chart    Vitals:    21 1456   BP: 142/78   Pulse: 78   SpO2: 98%   Weight: 120 kg (265 lb)   Height: 189.2 cm (74.5\")   PainSc:   7       Objective    Physical Exam   Const: NAD, A&Ox4, Pleasant, Cooperative  Eyes: EOMI, " no conjunctivitis  ENT: No nasal discharge present, neck supple  Cardiac: Regular rate and rhythm, no cyanosis  Resp: Respiratory rate within normal limits, no increased work of breathing, no audible wheezing or retractions noted  GI: No distention or ascites  MSK: Motor and sensation grossly intact in bilateral upper extremities  Neurologic: CN II-XII grossly intact  Psych: Appropriate mood and behavior.  HEALTH RISK ASSESSMENT    1955    Recent Hospitalizations:  No hospitalization(s) within the last year..      Current Medical Providers:  Patient Care Team:  Hernan Hilton DO as PCP - General (Family Medicine)      Smoking Status:  Social History     Tobacco Use   Smoking Status Former Smoker   • Packs/day: 1.00   • Years: 42.00   • Pack years: 42.00   • Types: Cigarettes   • Start date:    • Quit date:    • Years since quittin.8   Smokeless Tobacco Never Used   Tobacco Comment    Uses electronic cigarette       Alcohol Consumption:  Social History     Substance and Sexual Activity   Alcohol Use No       Depression Screen:   PHQ-2/PHQ-9 Depression Screening 2021   Little interest or pleasure in doing things 0   Feeling down, depressed, or hopeless 0   Trouble falling or staying asleep, or sleeping too much -   Feeling tired or having little energy -   Poor appetite or overeating -   Feeling bad about yourself - or that you are a failure or have let yourself or your family down -   Trouble concentrating on things, such as reading the newspaper or watching television -   Moving or speaking so slowly that other people could have noticed. Or the opposite - being so fidgety or restless that you have been moving around a lot more than usual -   Thoughts that you would be better off dead, or of hurting yourself in some way -   Total Score 0   If you checked off any problems, how difficult have these problems made it for you to do your work, take care of things at home, or get along with  other people? -       Health Habits and Functional and Cognitive Screening:  Functional & Cognitive Status 11/19/2021   Do you have difficulty preparing food and eating? No   Do you have difficulty bathing yourself, getting dressed or grooming yourself? No   Do you have difficulty using the toilet? No   Do you have difficulty moving around from place to place? No   Do you have trouble with steps or getting out of a bed or a chair? No   Current Diet Well Balanced Diet   Dental Exam Up to date   Eye Exam Up to date   Exercise (times per week) 3 times per week   Current Exercises Include Walking   Do you need help using the phone?  No   Are you deaf or do you have serious difficulty hearing?  No   Do you need help with transportation? No   Do you need help shopping? No   Do you need help preparing meals?  No   Do you need help with housework?  No   Do you need help with laundry? No   Do you need help taking your medications? No   Do you need help managing money? No   Do you ever drive or ride in a car without wearing a seat belt? No   Have you felt unusual stress, anger or loneliness in the last month? No   Who do you live with? Spouse   If you need help, do you have trouble finding someone available to you? No   Have you been bothered in the last four weeks by sexual problems? No   Do you have difficulty concentrating, remembering or making decisions? No         Does the patient have evidence of cognitive impairment? No        Aspirin use counseling? Does not need ASA (and currently is not on it)      Recent Lab Results:  CMP:  Lab Results   Component Value Date    BUN 8 (L) 05/30/2018    CREATININE 0.80 05/30/2018    EGFRIFNONA 98 05/30/2018    BCR 10.0 05/30/2018     05/30/2018    K 4.3 05/30/2018    CO2 30.0 05/30/2018    CALCIUM 8.9 05/30/2018    ALBUMIN 4.40 05/30/2018    BILITOT 0.6 05/30/2018    ALKPHOS 86 05/30/2018    AST 24 05/30/2018    ALT 16 05/30/2018     Lipid Panel:  Lab Results   Component Value  Date    CHOL 154 05/30/2018    TRIG 92 05/30/2018    HDL 51 05/30/2018     HbA1c:  Lab Results   Component Value Date    HGBA1C 5.1 05/24/2016       Visual Acuity:  No exam data present    Age-appropriate Screening Schedule:  Refer to the list below for future screening recommendations based on patient's age, sex and/or medical conditions. Orders for these recommended tests are listed in the plan section. The patient has been provided with a written plan.    Health Maintenance   Topic Date Due   • ZOSTER VACCINE (1 of 2) Never done   • INFLUENZA VACCINE  08/01/2021   • LIPID PANEL  11/19/2021   • TDAP/TD VACCINES (2 - Td or Tdap) 10/01/2024          Advance Care Planning:  ACP discussion was held with the patient during this visit. Patient has an advance directive (not in EMR), copy requested.    Identification of Risk Factors:  Risk factors include: Diabetic Lab Screening .    Compared to one year ago, the patient feels his physical health is the same.  Compared to one year ago, the patient feels his mental health is the same.      Diagnoses and all orders for this visit:    1. Medicare annual wellness visit, subsequent (Primary)    2. Preventative health care    3. Bilateral foot pain  -     Ambulatory Referral to Orthopedic Surgery  -     DULoxetine (CYMBALTA) 20 MG capsule; Take 1 capsule by mouth Daily.  Dispense: 30 capsule; Refill: 1    4. Charcot arthropathy of midfoot  -     Ambulatory Referral to Orthopedic Surgery  -     Ambulatory Referral to Neurology  -     DULoxetine (CYMBALTA) 20 MG capsule; Take 1 capsule by mouth Daily.  Dispense: 30 capsule; Refill: 1    5. Peripheral polyneuropathy  -     Ambulatory Referral to Neurology  -     DULoxetine (CYMBALTA) 20 MG capsule; Take 1 capsule by mouth Daily.  Dispense: 30 capsule; Refill: 1    6. Polyarthralgia    7. Screening for deficiency anemia    8. Screening for endocrine disorder    9. Screening for hyperlipidemia        Procedure   Procedures        Patient Self-Management and Personalized Health Advice  The patient has been provided with information about: diet and exercise and preventive services including:   · Annual Wellness Visit (AWV).    Diagnoses and all orders for this visit:    1. Medicare annual wellness visit, subsequent (Primary)    2. Preventative health care    3. Bilateral foot pain  -     Ambulatory Referral to Orthopedic Surgery  -     DULoxetine (CYMBALTA) 20 MG capsule; Take 1 capsule by mouth Daily.  Dispense: 30 capsule; Refill: 1    4. Charcot arthropathy of midfoot  -     Ambulatory Referral to Orthopedic Surgery  -     Ambulatory Referral to Neurology  -     DULoxetine (CYMBALTA) 20 MG capsule; Take 1 capsule by mouth Daily.  Dispense: 30 capsule; Refill: 1    5. Peripheral polyneuropathy  -     Ambulatory Referral to Neurology  -     DULoxetine (CYMBALTA) 20 MG capsule; Take 1 capsule by mouth Daily.  Dispense: 30 capsule; Refill: 1    6. Polyarthralgia    7. Screening for deficiency anemia    8. Screening for endocrine disorder    9. Screening for hyperlipidemia        Problem List Items Addressed This Visit        Health Encounters    Preventative health care       Neuro    Peripheral polyneuropathy    Relevant Medications    DULoxetine (CYMBALTA) 20 MG capsule    Other Relevant Orders    Ambulatory Referral to Neurology      Other Visit Diagnoses     Medicare annual wellness visit, subsequent    -  Primary    Bilateral foot pain        Relevant Medications    DULoxetine (CYMBALTA) 20 MG capsule    Other Relevant Orders    Ambulatory Referral to Orthopedic Surgery    Charcot arthropathy of midfoot        Relevant Medications    DULoxetine (CYMBALTA) 20 MG capsule    Other Relevant Orders    Ambulatory Referral to Orthopedic Surgery    Ambulatory Referral to Neurology    Polyarthralgia        Screening for deficiency anemia        Screening for endocrine disorder        Screening for hyperlipidemia            1. Bilateral foot  pain  - Severe, evidently he has some severe degenerative arthritis in both feet. From his description, it certainly sounds like Charcot arthropathy of the bilateral feet. He has a long history of neuropathy, and sounds like a likely family history of the same. 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 The patient has requested a referral to neurology for peripheral neuropathy, as well as foot surgery for the ongoing foot pain. Referrals for this have been placed. In the meantime, I think he would benefit from duloxetine for his nerve pain and arthropathy, as well as for the anxiety and mood changes associated with his chronic pain. We will follow up in about 1 month via video visit to check his status.    2. Polyarthralgia  - Rheumatologic labs have been ordered, particularly uric acid and TSH.    3. Obesity  - Secondary to decreased activity from his chronic pain.    Patient Instructions   1.  Continue medications and supplements - as listed.    2.  Continue well-balanced diet - low in calories and low in added sugar.  -Plant-based diets have the best evidence for decreasing inflammation and chronic pain, as well as reducing the risk of heart disease and dementia.    3.  Maintain a routine exercise program - 30 minutes at least 3 days every week.  This is important even if you are active at your job.    4.  Blood work has been ordered for you today. You do not need an appointment.  If you are unable to have your labs done today, please return over the next few days to have them done.  The nurse will call you with results (or they will become available via ipvive) or they will be discussed at your next scheduled visit.  -Lab services are available downstairs or at any [facility] outpatient lab center, including across the street at [address] [place] [address] Dr  -Note: As of January 1, 2021, all lab results are automatically released to ipvive immediately when they return whether they have been reviewed or  not. You may see your results show up on your MyChart without any context or comment. Please allow 1-2 business days after the labs have returned for them to be reviewed.          The wellness exam has been reviewed in detail.  The patient has been fully counseled on preventative guidelines for vaccines, cancer screenings, and other health maintenance needs.  Functional testing has been performed to assess capacity for independent living and need for other medical interventions.  Screening for depression was completed via a validated screening model as indicated above, 15 minutes was spent in total on depression screening.  The patient was counseled on maintaining a lifestyle to promote good health and to minimize chronic diseases, including 15 minutes spent screening for alcohol misuse and counseling on safe and appropriate alcohol intake and overall risk reduction as indicated above.  The patient has been assisted with scheduling healthcare procedures for the coming year and given a written document outlining these recommendations.    Follow Up:  Return in about 4 weeks (around 12/17/2021) for Doximity video visit.     An After Visit Summary and PPPS with all of these plans were given to the patient.           Transcribed from ambient dictation for Hernan Hilton DO by Martin Keating  11/19/21   18:40 EST    I have personally performed the services described in this document as transcribed by the above individual, and it is both accurate and complete.  Hernan Hilton DO  11/22/2021  12:47 EST

## 2021-11-24 ENCOUNTER — LAB (OUTPATIENT)
Dept: LAB | Facility: HOSPITAL | Age: 66
End: 2021-11-24

## 2021-11-24 DIAGNOSIS — Z12.5 SCREENING PSA (PROSTATE SPECIFIC ANTIGEN): ICD-10-CM

## 2021-11-24 DIAGNOSIS — Z13.0 SCREENING FOR DEFICIENCY ANEMIA: ICD-10-CM

## 2021-11-24 DIAGNOSIS — Z13.29 SCREENING FOR ENDOCRINE DISORDER: ICD-10-CM

## 2021-11-24 DIAGNOSIS — Z13.220 SCREENING FOR HYPERLIPIDEMIA: ICD-10-CM

## 2021-11-24 LAB
ALBUMIN SERPL-MCNC: 4.3 G/DL (ref 3.5–5.2)
ALBUMIN/GLOB SERPL: 1.3 G/DL
ALP SERPL-CCNC: 96 U/L (ref 39–117)
ALT SERPL W P-5'-P-CCNC: 14 U/L (ref 1–41)
ANION GAP SERPL CALCULATED.3IONS-SCNC: 10.1 MMOL/L (ref 5–15)
AST SERPL-CCNC: 19 U/L (ref 1–40)
BILIRUB SERPL-MCNC: 0.4 MG/DL (ref 0–1.2)
BILIRUB UR QL STRIP: NEGATIVE
BUN SERPL-MCNC: 7 MG/DL (ref 8–23)
BUN/CREAT SERPL: 7.9 (ref 7–25)
CALCIUM SPEC-SCNC: 9 MG/DL (ref 8.6–10.5)
CHLORIDE SERPL-SCNC: 105 MMOL/L (ref 98–107)
CHOLEST SERPL-MCNC: 144 MG/DL (ref 0–200)
CLARITY UR: CLEAR
CO2 SERPL-SCNC: 24.9 MMOL/L (ref 22–29)
COLOR UR: YELLOW
CREAT SERPL-MCNC: 0.89 MG/DL (ref 0.76–1.27)
DEPRECATED RDW RBC AUTO: 42.6 FL (ref 37–54)
ERYTHROCYTE [DISTWIDTH] IN BLOOD BY AUTOMATED COUNT: 12.4 % (ref 12.3–15.4)
GFR SERPL CREATININE-BSD FRML MDRD: 86 ML/MIN/1.73
GLOBULIN UR ELPH-MCNC: 3.2 GM/DL
GLUCOSE SERPL-MCNC: 102 MG/DL (ref 65–99)
GLUCOSE UR STRIP-MCNC: NEGATIVE MG/DL
HBA1C MFR BLD: 5.49 % (ref 4.8–5.6)
HCT VFR BLD AUTO: 40 % (ref 37.5–51)
HDLC SERPL-MCNC: 48 MG/DL (ref 40–60)
HGB BLD-MCNC: 14.1 G/DL (ref 13–17.7)
HGB UR QL STRIP.AUTO: NEGATIVE
KETONES UR QL STRIP: ABNORMAL
LDLC SERPL CALC-MCNC: 80 MG/DL (ref 0–100)
LDLC/HDLC SERPL: 1.65 {RATIO}
LEUKOCYTE ESTERASE UR QL STRIP.AUTO: NEGATIVE
MCH RBC QN AUTO: 33.1 PG (ref 26.6–33)
MCHC RBC AUTO-ENTMCNC: 35.3 G/DL (ref 31.5–35.7)
MCV RBC AUTO: 93.9 FL (ref 79–97)
NITRITE UR QL STRIP: NEGATIVE
PH UR STRIP.AUTO: 7.5 [PH] (ref 5–8)
PLATELET # BLD AUTO: 303 10*3/MM3 (ref 140–450)
PMV BLD AUTO: 10.9 FL (ref 6–12)
POTASSIUM SERPL-SCNC: 4.3 MMOL/L (ref 3.5–5.2)
PROT SERPL-MCNC: 7.5 G/DL (ref 6–8.5)
PROT UR QL STRIP: NEGATIVE
RBC # BLD AUTO: 4.26 10*6/MM3 (ref 4.14–5.8)
SODIUM SERPL-SCNC: 140 MMOL/L (ref 136–145)
SP GR UR STRIP: 1.02 (ref 1–1.03)
TRIGL SERPL-MCNC: 84 MG/DL (ref 0–150)
TSH SERPL DL<=0.05 MIU/L-ACNC: 0.96 UIU/ML (ref 0.27–4.2)
UROBILINOGEN UR QL STRIP: ABNORMAL
VLDLC SERPL-MCNC: 16 MG/DL (ref 5–40)
WBC NRBC COR # BLD: 6.45 10*3/MM3 (ref 3.4–10.8)

## 2021-11-24 PROCEDURE — 83036 HEMOGLOBIN GLYCOSYLATED A1C: CPT

## 2021-11-24 PROCEDURE — 80053 COMPREHEN METABOLIC PANEL: CPT

## 2021-11-24 PROCEDURE — G0103 PSA SCREENING: HCPCS

## 2021-11-24 PROCEDURE — 80061 LIPID PANEL: CPT

## 2021-11-24 PROCEDURE — 84443 ASSAY THYROID STIM HORMONE: CPT

## 2021-11-24 PROCEDURE — 85027 COMPLETE CBC AUTOMATED: CPT

## 2021-11-24 PROCEDURE — 81003 URINALYSIS AUTO W/O SCOPE: CPT

## 2021-11-25 LAB — PSA SERPL-MCNC: 0.83 NG/ML (ref 0–4)

## 2022-01-14 ENCOUNTER — TELEMEDICINE (OUTPATIENT)
Dept: FAMILY MEDICINE CLINIC | Facility: CLINIC | Age: 67
End: 2022-01-14

## 2022-01-14 DIAGNOSIS — M79.671 BILATERAL FOOT PAIN: ICD-10-CM

## 2022-01-14 DIAGNOSIS — G62.9 PERIPHERAL POLYNEUROPATHY: ICD-10-CM

## 2022-01-14 DIAGNOSIS — M14.679 CHARCOT ARTHROPATHY OF MIDFOOT: ICD-10-CM

## 2022-01-14 DIAGNOSIS — M79.672 BILATERAL FOOT PAIN: ICD-10-CM

## 2022-01-14 PROCEDURE — 99213 OFFICE O/P EST LOW 20 MIN: CPT | Performed by: FAMILY MEDICINE

## 2022-01-14 RX ORDER — DULOXETIN HYDROCHLORIDE 30 MG/1
30 CAPSULE, DELAYED RELEASE ORAL DAILY
Qty: 30 CAPSULE | Refills: 2 | Status: SHIPPED | OUTPATIENT
Start: 2022-01-14 | End: 2022-02-22 | Stop reason: SDUPTHER

## 2022-01-14 NOTE — PROGRESS NOTES
Subjective   Otis Loredo is a 67 y.o. male.     Chief Complaint   Patient presents with   • Follow-up     peripheral neuropathy       History of Present Illness     Otis Loredo presents today for   Chief Complaint   Patient presents with   • Follow-up     peripheral neuropathy     He feels like the duloxetine is helping with the numbness, although not with the pain. He is interested in increasing the dosage. He sees Dr. Myrick next week.    You have chosen to receive care through a telehealth visit.  Do you consent to use a video/audio connection for your medical care today? Yes    The following portions of the patient's history were reviewed and updated as appropriate: allergies, current medications, past family history, past medical history, past social history, past surgical history and problem list.    Active Ambulatory Problems     Diagnosis Date Noted   • Atopic rhinitis 05/24/2016   • Pes planus 05/24/2016   • Hemorrhoids 05/24/2016   • Adiposity 05/24/2016   • Disorder of prostate 05/24/2016   • Fam hx-ischem heart disease 05/24/2016   • Preventative health care 05/24/2016   • Plantar fasciitis 05/24/2016   • Tinnitus of both ears 09/27/2016   • Low back pain 10/26/2017   • Palpitations 05/30/2018   • Peripheral polyneuropathy 11/21/2018     Resolved Ambulatory Problems     Diagnosis Date Noted   • Acute bacterial conjunctivitis of left eye 05/24/2016   • Vitamin D deficiency 05/25/2016   • Hyperglycemia 05/25/2016     Past Medical History:   Diagnosis Date   • Allergic seasonal   • Allergic rhinitis Lifelong   • Cholelithiasis    • CTS (carpal tunnel syndrome) 2005   • Flat foot Adulthood   • GERD (gastroesophageal reflux disease)    • HL (hearing loss)    • Obesity 2015   • Scarlet fever 1962   • Varicose veins of both lower extremities Adulthood     Past Surgical History:   Procedure Laterality Date   • APPENDECTOMY  1975   • CARPAL TUNNEL RELEASE Bilateral 2005     Dr. Pacheco   •  CHOLECYSTECTOMY     • TONSILLECTOMY       Family History   Problem Relation Age of Onset   • Heart attack Mother    • Arthritis Mother    • Breast cancer Mother    • Hypertension Mother    • Obesity Mother         Morbid   • Stroke Mother    • Sick sinus syndrome Mother         Pacemaker   • Pneumonia Mother          age 79   • Asthma Mother    • Cancer Mother    • Heart disease Mother    • Allergies Father    • Alzheimer's disease Father          age 84   • Obesity Father         Morbid   • Valvular heart disease Father    • Asthma Father    • Heart disease Father    • Diabetes Sister    • Gout Sister    • Obesity Sister         Morbid   • Allergies Son    • Hyperlipidemia Son    • No Known Problems Brother    • No Known Problems Sister      Social History     Socioeconomic History   • Marital status:    Tobacco Use   • Smoking status: Former Smoker     Packs/day: 1.00     Years: 42.00     Pack years: 42.00     Types: Cigarettes     Start date:      Quit date:      Years since quittin.0   • Smokeless tobacco: Never Used   • Tobacco comment: Uses electronic cigarette   Substance and Sexual Activity   • Alcohol use: No   • Drug use: No   • Sexual activity: Yes     Partners: Female       Review of Systems  Review of Systems -  General ROS: negative for - chills, fever or night sweats  Cardiovascular ROS: no chest pain or dyspnea on exertion  Gastrointestinal ROS: no abdominal pain, change in bowel habits, or black or bloody stools  Genito-Urinary ROS: no dysuria, trouble voiding, or hematuria    Objective   There were no vitals taken for this visit.  Vitals obtained from patient if available  Physical Exam  Const: Non-toxic appearing, NAD, A&Ox4, Pleasant, Cooperative  Eyes: EOMI, no conjunctivitis  ENT: No copious nasal drainage noted  Cardiac: Regular rate by pulse  Resp: Respiratory rate observed to be within normal limits, no increased work of breathing observed, no audible  wheezing or cough noted  Psych: Appropriate mood and behavior.  Procedures  Assessment/Plan   Problem List Items Addressed This Visit        Neuro    Peripheral polyneuropathy    Relevant Medications    DULoxetine (CYMBALTA) 30 MG capsule    Other Relevant Orders    Vitamin B12    Folate      Other Visit Diagnoses     Bilateral foot pain        Relevant Medications    DULoxetine (CYMBALTA) 30 MG capsule    Charcot arthropathy of midfoot        Relevant Medications    DULoxetine (CYMBALTA) 30 MG capsule          See patient diagnoses and orders along with patient instructions for assessment, plan, and changes to care for patient.    This visit was conducted via telemedicine with live video and audio provided through Video Options: MyChart/Zoom at the point of care.    There are no Patient Instructions on file for this visit.    No follow-ups on file.    Ambulatory progress note signed and attested to by Hernan Hilton D.O.

## 2022-01-18 ENCOUNTER — TELEPHONE (OUTPATIENT)
Dept: FAMILY MEDICINE CLINIC | Facility: CLINIC | Age: 67
End: 2022-01-18

## 2022-01-18 NOTE — TELEPHONE ENCOUNTER
Called and spoke to pt's wife. Informed of labs that were needing to be done and no other pending notes or reasons for missed call. Voiced understanding. Stated pt will be in to get ordered lab work completed.

## 2022-02-16 ENCOUNTER — OFFICE VISIT (OUTPATIENT)
Dept: ORTHOPEDIC SURGERY | Facility: CLINIC | Age: 67
End: 2022-02-16

## 2022-02-16 VITALS
DIASTOLIC BLOOD PRESSURE: 88 MMHG | SYSTOLIC BLOOD PRESSURE: 132 MMHG | BODY MASS INDEX: 34.01 KG/M2 | HEIGHT: 74 IN | WEIGHT: 265 LBS

## 2022-02-16 DIAGNOSIS — M79.671 BILATERAL FOOT PAIN: Primary | ICD-10-CM

## 2022-02-16 DIAGNOSIS — G62.9 NEUROPATHY: ICD-10-CM

## 2022-02-16 DIAGNOSIS — I87.8 VENOUS STASIS: ICD-10-CM

## 2022-02-16 DIAGNOSIS — M79.672 BILATERAL FOOT PAIN: Primary | ICD-10-CM

## 2022-02-16 PROCEDURE — 99203 OFFICE O/P NEW LOW 30 MIN: CPT | Performed by: ORTHOPAEDIC SURGERY

## 2022-02-16 NOTE — PROGRESS NOTES
"NEW PATIENT    Patient: Otis Loredo  : 1955    Primary Care Provider: Hernan Hilton DO    Requesting Provider: As above    Pain of the Left Foot and Pain of the Right Foot      History    Chief Complaint: Bilateral foot pain    History of Present Illness: This is an extremely pleasant 67-year-old gentleman here today with his wife. He knows me because evidently his mother was my patient in the distant past. He is here for a second opinion regarding bilateral foot pain. He reports that all his life he has had some trouble with his feet. He has worn good over-the-counter orthotics. Over the past several years he has had increased pain in both feet. It is often numb, burning, dysesthetic. It feels sometimes as if he is walking on something or that the arches thickened. He has seen podiatry. They tried some hard plastic orthotics that he could not wear. He had a Velcro brace around the ankle that felt somewhat comfortable. They then made the AFOs which were not comfortable. He was told he has neuropathy, he was told his feet \"are falling apart\" and that the \"bones are \".  He reports he does not have a history of diabetes.  His father had neuropathy.  He has started taking Cymbalta, and the dose was recently increased, he reports that really has helped the numbness and some of the pain but not all of it.  He has an appointment to see a neurologist for evaluation and the near future.  He has been wondering if the bones in his feet are falling apart he has heard about this, I explained to him that he does not have a Charcot joint at this time.  He also has significant swelling and varicosities, he reports he does wear compression socks he just did not wear them today.  He quit all nicotine products in  per his wife    Current Outpatient Medications on File Prior to Visit   Medication Sig Dispense Refill   • aspirin (ASPIR-LOW) 81 MG EC tablet Take 1 tablet by mouth daily.     • DULoxetine " (CYMBALTA) 30 MG capsule Take 1 capsule by mouth Daily. 30 capsule 2   • fluticasone (FLONASE) 50 MCG/ACT nasal spray 1 spray into the nostril(s) as directed by provider Daily. 3 bottle 3   • Misc Natural Products (OSTEO BI-FLEX ADV DOUBLE ST) tablet Take 1 tablet by mouth daily.     • Multiple Vitamin tablet Take 1 tablet by mouth daily.     • Omega-3 Fatty Acids (FISH OIL) 600 MG capsule Take  by mouth daily.     • Psyllium Husk powder Take 15 mL by mouth daily. Mix in 6 ounces of water or juice     • shark liver oil-cocoa butter (PREPARATION H) 0.25-3-85.5 % suppository Preparation H 0.25-3-85.5 % Rectal Suppository; Patient Sig: Preparation H 0.25-3-85.5 % Rectal Suppository USE AS DIRECTED.  In the evening as needed; 0; 16-Apr-2014; Active       No current facility-administered medications on file prior to visit.      Allergies   Allergen Reactions   • Nicoderm [Nicotine] Dermatitis      Past Medical History:   Diagnosis Date   • Allergic seasonal   • Allergic rhinitis Lifelong   • Cholelithiasis    • CTS (carpal tunnel syndrome) 2005    Improved with surgical repair   • Flat foot Adulthood    Bilateral- improved with orthotic inserts    • GERD (gastroesophageal reflux disease)    • Hemorrhoids 2013   • HL (hearing loss)    • Low back pain    • Obesity 2015    BMI 31 - body weight 225   • Plantar fasciitis 2015    Severe symptoms relieved with new orthotics   • Scarlet fever 1962   • Varicose veins of both lower extremities Adulthood    Ankle irritability     Past Surgical History:   Procedure Laterality Date   • APPENDECTOMY  1975   • CARPAL TUNNEL RELEASE Bilateral 2005     Dr. Pacheco   • CHOLECYSTECTOMY  1975   • TONSILLECTOMY  1965     Family History   Problem Relation Age of Onset   • Heart attack Mother    • Arthritis Mother    • Breast cancer Mother    • Hypertension Mother    • Obesity Mother         Morbid   • Stroke Mother    • Sick sinus syndrome Mother         Pacemaker   • Pneumonia Mother        "   age 79   • Asthma Mother    • Cancer Mother    • Heart disease Mother    • Allergies Father    • Alzheimer's disease Father          age 84   • Obesity Father         Morbid   • Valvular heart disease Father    • Asthma Father    • Heart disease Father    • Diabetes Sister    • Gout Sister    • Obesity Sister         Morbid   • Allergies Son    • Hyperlipidemia Son    • No Known Problems Brother    • No Known Problems Sister       Social History     Socioeconomic History   • Marital status:    Tobacco Use   • Smoking status: Former Smoker     Packs/day: 1.00     Years: 42.00     Pack years: 42.00     Types: Cigarettes     Start date:      Quit date:      Years since quittin.1   • Smokeless tobacco: Never Used   • Tobacco comment: Uses electronic cigarette   Substance and Sexual Activity   • Alcohol use: No   • Drug use: No   • Sexual activity: Yes     Partners: Female        Review of Systems   Constitutional: Negative.    HENT: Negative.    Eyes: Negative.    Respiratory: Negative.    Cardiovascular: Negative.    Gastrointestinal: Negative.    Endocrine: Negative.    Genitourinary: Negative.    Musculoskeletal: Positive for arthralgias.   Skin: Negative.    Allergic/Immunologic: Negative.    Neurological: Negative.    Hematological: Negative.    Psychiatric/Behavioral: Negative.        The following portions of the patient's history were reviewed and updated as appropriate: allergies, current medications, past family history, past medical history, past social history, past surgical history and problem list.    Physical Exam:   /88   Ht 189.2 cm (74.49\")   Wt 120 kg (265 lb)   BMI 33.58 kg/m²   GENERAL: Body habitus: obese    Lower extremity edema: Right: 1+ pitting; Left: 1+ pitting    Varicose veins:  Right: severe; Left: severe    Gait: Gait is slightly short stride and, no definite foot drop that I can identify he can dorsiflex both feet off the floor     Mental Status:  " awake and alert; oriented to person, place, and time    Voice:  clear  SKIN:  Lower extremity: Normal    Hair Growth(lower extremity):  Right:diminished; Left:  diminished  NAILS: Toenails: thick  HEENT: Head: Normocephalic, atraumatic,  without obvious abnormality.  eye: normal external eye, no icterus  ears:normal external ears  PULM:  Repiratory effort normal  CV:  Dorsalis Pedis:  Right: 2+; Left:1+    Posterior Tibial: Right:2+; Left:2+    Capillary Refill:  Brisk  MSK:  Hand:mild arthritis, Heberden's nodes      Tibia:  Right:  non tender; Left:  non tender      Ankle:  Right: non tender; Left:  non tender      Foot:  Right:  Moderate metatarsus adductus, mildly tender to palpation diffusely on the plantar surface of the foot; Left:  Moderate metatarsus adductus, more tender to palpation throughout the whole plantar surface of the foot      NEURO: Heel Walking:  Right:  Able to dorsiflex foot off the ground, but not able to walk on the heels; Left:  Able to dorsiflex foot off the ground but not able to walk on the heels    Toe Walking:  Right:  normal; Left:  normal     [place]-Briseida 5.07 monofilament test: absent    Lower extremity sensation: diminished      Testing with 128Hz tuning fork shows decreased vibratory sense bilat    Calf Atrophy:none    Motor Function: all 5/5 all lower extremity motors are 5 out of 5 to gross motor testing         Medical Decision Making    Data Review:   ordered and reviewed x-rays today and reviewed outside records    Assessment and Plan/ Diagnosis/Treatment options:   1. Bilateral foot pain  I discussed neuropathy, Charcot joints, arthritis with the patient and his wife in detail.  I explained I do not see anything that looks like a Charcot joint at this time.  I explained that I think he has 2 sources for pain in the feet.  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  But later in life it does lead to arthritis in those joints.  I explained the metatarsus adductus is why he tends to walk on the lateral border of both feet.  I would recommend some better custom orthotics that are not rigid plastic and I gave him a prescription.  We also talked about topical Voltaren gel.  I think the second and major part of his symptoms is neuropathy.  I explained that diabetes is most common cause neuropathy but then there is lots of patients where we never know the cause.  It is good that he is going to see a neurologist in the near future.  I explained that unfortunately I do not have any surgery that will help him.  He asked me if he will ever develop a sharp or joint explained there is no way to know, but we discussed the symptoms and what to look for.  I will be happy to see him anytime  - XR Foot 2 View Bilateral  - XR Ankle 2 View Bilateral    2. Neuropathy  As above I think a large part of his pain is due to neuropathy    3. Venous stasis  Definitely continue to wear compression socks            Part of this encounter note is an electronic transcription/translation of spoken language to printed text. The electronic translation of spoken language may permit erroneous, or at times, nonsensical words or phrases to be inadvertently transcribed; Although I have reviewed the note for such errors, some may still exist.          Anjana Burris MD

## 2022-02-22 ENCOUNTER — LAB (OUTPATIENT)
Dept: LAB | Facility: HOSPITAL | Age: 67
End: 2022-02-22

## 2022-02-22 ENCOUNTER — OFFICE VISIT (OUTPATIENT)
Dept: NEUROLOGY | Facility: CLINIC | Age: 67
End: 2022-02-22

## 2022-02-22 VITALS
WEIGHT: 265 LBS | BODY MASS INDEX: 34.01 KG/M2 | DIASTOLIC BLOOD PRESSURE: 82 MMHG | SYSTOLIC BLOOD PRESSURE: 130 MMHG | OXYGEN SATURATION: 94 % | HEIGHT: 74 IN | HEART RATE: 74 BPM

## 2022-02-22 DIAGNOSIS — M79.671 BILATERAL FOOT PAIN: ICD-10-CM

## 2022-02-22 DIAGNOSIS — M14.679 CHARCOT ARTHROPATHY OF MIDFOOT: ICD-10-CM

## 2022-02-22 DIAGNOSIS — G62.9 PERIPHERAL POLYNEUROPATHY: Primary | ICD-10-CM

## 2022-02-22 DIAGNOSIS — M79.672 BILATERAL FOOT PAIN: ICD-10-CM

## 2022-02-22 DIAGNOSIS — R26.81 UNSTEADINESS ON FEET: ICD-10-CM

## 2022-02-22 DIAGNOSIS — G62.9 PERIPHERAL POLYNEUROPATHY: ICD-10-CM

## 2022-02-22 PROCEDURE — 82784 ASSAY IGA/IGD/IGG/IGM EACH: CPT

## 2022-02-22 PROCEDURE — 84155 ASSAY OF PROTEIN SERUM: CPT

## 2022-02-22 PROCEDURE — 86235 NUCLEAR ANTIGEN ANTIBODY: CPT

## 2022-02-22 PROCEDURE — 86334 IMMUNOFIX E-PHORESIS SERUM: CPT

## 2022-02-22 PROCEDURE — 82607 VITAMIN B-12: CPT

## 2022-02-22 PROCEDURE — 86431 RHEUMATOID FACTOR QUANT: CPT

## 2022-02-22 PROCEDURE — 86200 CCP ANTIBODY: CPT

## 2022-02-22 PROCEDURE — 86225 DNA ANTIBODY NATIVE: CPT

## 2022-02-22 PROCEDURE — 82550 ASSAY OF CK (CPK): CPT

## 2022-02-22 PROCEDURE — 84165 PROTEIN E-PHORESIS SERUM: CPT

## 2022-02-22 PROCEDURE — 82746 ASSAY OF FOLIC ACID SERUM: CPT

## 2022-02-22 PROCEDURE — 36415 COLL VENOUS BLD VENIPUNCTURE: CPT

## 2022-02-22 PROCEDURE — 86038 ANTINUCLEAR ANTIBODIES: CPT

## 2022-02-22 PROCEDURE — 99204 OFFICE O/P NEW MOD 45 MIN: CPT | Performed by: PSYCHIATRY & NEUROLOGY

## 2022-02-22 RX ORDER — DULOXETIN HYDROCHLORIDE 60 MG/1
60 CAPSULE, DELAYED RELEASE ORAL DAILY
Qty: 30 CAPSULE | Refills: 5 | Status: SHIPPED | OUTPATIENT
Start: 2022-02-22 | End: 2022-03-29 | Stop reason: SDUPTHER

## 2022-02-22 NOTE — PROGRESS NOTES
"Chief Complaint  Peripheral Neuropathy    Subjective          Otis Loredo presents to [facility]UR[facility]     History of Present Illness    67 y.o. male referred by Dr Hernan Hilton for peripheral neuropathy.     Sx worsened in last 4 years.      Pain in heels bilaterally.  L > R.  Denies weakness.  Left foot 7 - 8/10.  Quality is throbbing.  Worse at end of the day.      Back of ankles feel tight.      Cymbalta decreases N/T.      Reviewed medical records:    Dx of peripheral neuropathy. Increased N/B/T in feet.  Father had neuropathy.      Cymbalta helping with foot pain.      Labs - 11/24/21 A1C 5.49; CBC,CMP - NCS    Objective   Vital Signs:   /82   Pulse 74   Ht 189.2 cm (74.49\")   Wt 120 kg (265 lb)   SpO2 94%   BMI 33.58 kg/m²     Physical Exam  Eyes:      Extraocular Movements: EOM normal.      Pupils: Pupils are equal, round, and reactive to light.   Neurological:      Mental Status: He is oriented to person, place, and time.      Coordination: Finger-Nose-Finger Test normal.      Gait: Gait is intact.      Deep Tendon Reflexes: Strength normal.      Reflex Scores:       Patellar reflexes are 0 on the right side and 0 on the left side.       Achilles reflexes are 0 on the right side and 0 on the left side.  Psychiatric:         Speech: Speech normal.          Neurologic Exam     Mental Status   Oriented to person, place, and time.   Attention: normal. Concentration: normal.   Speech: speech is normal   Level of consciousness: alert  Knowledge: good and consistent with education.   Normal comprehension.     Cranial Nerves     CN II   Visual fields full to confrontation.   Visual acuity: normal  Right visual field deficit: none  Left visual field deficit: none     CN III, IV, VI   Pupils are equal, round, and reactive to light.  Extraocular motions are normal.   Nystagmus: none   Diplopia: none  Ophthalmoparesis: none  Upgaze: normal  Downgaze: normal  Conjugate gaze: " present    CN V   Facial sensation intact.   Right corneal reflex: normal  Left corneal reflex: normal    CN VII   Right facial weakness: none  Left facial weakness: none    CN VIII   Hearing: intact    CN IX, X   Palate: symmetric  Right gag reflex: normal  Left gag reflex: normal    CN XI   Right sternocleidomastoid strength: normal  Left sternocleidomastoid strength: normal    CN XII   Tongue: not atrophic  Fasciculations: absent  Tongue deviation: none    Motor Exam   Muscle bulk: normal  Overall muscle tone: normal  Right arm tone: normal  Left arm tone: normal  Right leg tone: normal  Left leg tone: normal    Strength   Strength 5/5 throughout.     Sensory Exam   Right arm light touch: normal  Left arm light touch: normal  Right leg light touch: decreased from ankle  Left leg light touch: decreased from ankle  Right arm pinprick: normal  Left arm pinprick: normal  Right leg pinprick: decreased from ankle  Left leg pinprick: decreased from ankle    Gait, Coordination, and Reflexes     Gait  Gait: normal    Coordination   Finger to nose coordination: normal    Tremor   Resting tremor: absent  Intention tremor: absent  Action tremor: absent    Reflexes   Reflexes 2+ except as noted.   Right patellar: 0  Left patellar: 0  Right achilles: 0  Left achilles: 0     Result Review :   The following data was reviewed by: Trevor Myrick MD on 02/22/2022:  Common labs    Common Labsle 11/24/21 11/24/21 11/24/21 11/24/21 11/24/21    1027 1027 1027 1027 1027   Glucose    102 (A)    BUN    7 (A)    Creatinine    0.89    eGFR Non African Am    86    Sodium    140    Potassium    4.3    Chloride    105    Calcium    9.0    Albumin    4.30    Total Bilirubin    0.4    Alkaline Phosphatase    96    AST (SGOT)    19    ALT (SGPT)    14    WBC 6.45       Hemoglobin 14.1       Hematocrit 40.0       Platelets 303       Total Cholesterol   144     Triglycerides   84     HDL Cholesterol   48     LDL Cholesterol    80     Hemoglobin  A1C  5.49      PSA     0.833   (A) Abnormal value                      Assessment and Plan    Diagnoses and all orders for this visit:    1. Peripheral polyneuropathy (Primary)  Assessment & Plan:  Worsening sx in last 4 years    Labs    EMG/NCS B LE     Orders:  -     DULoxetine (CYMBALTA) 60 MG capsule; Take 1 capsule by mouth Daily.  Dispense: 30 capsule; Refill: 5  -     RASHAD by IFA, Reflex 9-biomarkers profile; Future  -     ERIK + PE; Future  -     CK; Future  -     Rheumatoid Arthritis (RA) Profile; Future  -     Nerve Conduction Test; Future    2. Bilateral foot pain  -     DULoxetine (CYMBALTA) 60 MG capsule; Take 1 capsule by mouth Daily.  Dispense: 30 capsule; Refill: 5    3. Charcot arthropathy of midfoot  -     DULoxetine (CYMBALTA) 60 MG capsule; Take 1 capsule by mouth Daily.  Dispense: 30 capsule; Refill: 5    4. Unsteadiness on feet   -     Nerve Conduction Test; Future      Follow Up   No follow-ups on file.  Patient was given instructions and counseling regarding his condition or for health maintenance advice. Please see specific information pulled into the AVS if appropriate.

## 2022-02-23 LAB
CK SERPL-CCNC: 114 U/L (ref 20–200)
FOLATE SERPL-MCNC: >20 NG/ML (ref 4.78–24.2)
VIT B12 BLD-MCNC: 669 PG/ML (ref 211–946)

## 2022-02-24 LAB
ALBUMIN SERPL ELPH-MCNC: 3.6 G/DL (ref 2.9–4.4)
ALBUMIN/GLOB SERPL: 1.1 {RATIO} (ref 0.7–1.7)
ALPHA1 GLOB SERPL ELPH-MCNC: 0.2 G/DL (ref 0–0.4)
ALPHA2 GLOB SERPL ELPH-MCNC: 0.8 G/DL (ref 0.4–1)
B-GLOBULIN SERPL ELPH-MCNC: 1.2 G/DL (ref 0.7–1.3)
CCP IGA+IGG SERPL IA-ACNC: 7 UNITS (ref 0–19)
GAMMA GLOB SERPL ELPH-MCNC: 1.1 G/DL (ref 0.4–1.8)
GLOBULIN SER-MCNC: 3.3 G/DL (ref 2.2–3.9)
IGA SERPL-MCNC: 414 MG/DL (ref 61–437)
IGG SERPL-MCNC: 1115 MG/DL (ref 603–1613)
IGM SERPL-MCNC: 26 MG/DL (ref 20–172)
INTERPRETATION SERPL IEP-IMP: NORMAL
LABORATORY COMMENT REPORT: NORMAL
M PROTEIN SERPL ELPH-MCNC: NORMAL G/DL
PROT SERPL-MCNC: 6.9 G/DL (ref 6–8.5)
RHEUMATOID FACT SERPL-ACNC: <10 IU/ML

## 2022-02-25 LAB
ANA HOMOGEN TITR SER: ABNORMAL {TITER}
ANA TITR SER IF: POSITIVE {TITER}
CENTROMERE B AB SER-ACNC: <0.2 AI (ref 0–0.9)
CHROMATIN AB SERPL-ACNC: 0.3 AI (ref 0–0.9)
DSDNA AB SER-ACNC: <1 IU/ML (ref 0–9)
ENA JO1 AB SER-ACNC: <0.2 AI (ref 0–0.9)
ENA RNP AB SER-ACNC: 0.2 AI (ref 0–0.9)
ENA SCL70 AB SER-ACNC: <0.2 AI (ref 0–0.9)
ENA SM AB SER-ACNC: <0.2 AI (ref 0–0.9)
ENA SS-A AB SER-ACNC: 0.4 AI (ref 0–0.9)
ENA SS-B AB SER-ACNC: <0.2 AI (ref 0–0.9)
LABORATORY COMMENT REPORT: ABNORMAL
Lab: ABNORMAL
Lab: ABNORMAL

## 2022-03-29 ENCOUNTER — PROCEDURE VISIT (OUTPATIENT)
Dept: NEUROLOGY | Facility: CLINIC | Age: 67
End: 2022-03-29

## 2022-03-29 DIAGNOSIS — M79.671 BILATERAL FOOT PAIN: ICD-10-CM

## 2022-03-29 DIAGNOSIS — M14.679 CHARCOT ARTHROPATHY OF MIDFOOT: ICD-10-CM

## 2022-03-29 DIAGNOSIS — M79.672 BILATERAL FOOT PAIN: ICD-10-CM

## 2022-03-29 DIAGNOSIS — G62.9 PERIPHERAL POLYNEUROPATHY: Primary | Chronic | ICD-10-CM

## 2022-03-29 PROCEDURE — 95886 MUSC TEST DONE W/N TEST COMP: CPT | Performed by: PSYCHIATRY & NEUROLOGY

## 2022-03-29 PROCEDURE — 95911 NRV CNDJ TEST 9-10 STUDIES: CPT | Performed by: PSYCHIATRY & NEUROLOGY

## 2022-03-29 RX ORDER — DULOXETIN HYDROCHLORIDE 60 MG/1
60 CAPSULE, DELAYED RELEASE ORAL 2 TIMES DAILY
Qty: 60 CAPSULE | Refills: 5 | Status: SHIPPED | OUTPATIENT
Start: 2022-03-29 | End: 2022-04-26 | Stop reason: DRUGHIGH

## 2022-03-29 NOTE — PROGRESS NOTES
[facility]   Electrodiagnostic Laboratory    Nerve Conduction & EMG Report        Patient:  Otis Loredo   Patient ID: [id number]   YOB: 1955  Sex:  male      Exam Physician:  Trevor Myrick MD        Electromyogram and Nerve Conduction Velocity Procedure Note    Hx: 67 y.o. right handed male with complaint of numbness involving the both lower extremities and pain involving the both lower extremities. Symptoms have been present for 4 years and were provoked by activity. Significant past medical history includes peripheral neuropathy. Medications include Cymbalta. Family history no family history of nerve or muscle disease.    Exam: Motor power is normal. There is no atrophy. There are no fasciculations. Deep tendon reflexes are hypoactive. Sensory exam is abnormal distal symmetrical loss of pin and light touch in the feet.    Edx studies of the B LE were performed to evaluate for peripheral neuropathy.     NCS Examination   For sensory nerve conduction studies, the amplitude is measured peak-to-peak, the latency reported is the distal peak latency, and the conduction velocity, if measured, is determined from onset latencies and is over the forearm.   For motor nerve conduction studies, the amplitude is measured baseline-to-peak, the latency reported is the distal onset latency, the conduction velocity is calculated over the forearm, and the F wave latency is the minimum latency.   Unless otherwise noted, the hand temperature was monitored continuously and remained between 32°C and 36°C during the performance of the NCSs.            Sensory NCS      Nerve / Sites Rec. Site Onset Lat Peak Lat NP Amp PP Amp Segments Distance Velocity     ms ms µV µV  mm m/s   L Sural - Ankle (Calf)      Calf Ankle NR NR NR NR Calf - Ankle 140 NR   R Sural - Ankle (Calf)      Calf Ankle NR NR NR NR Calf - Ankle 140 NR   L Superficial peroneal - Ankle      Lat leg Ankle NR NR NR NR Lat leg - Ankle  140 NR   R Superficial peroneal - Ankle      Lat leg Ankle NR NR NR NR Lat leg - Ankle 140 NR               Motor NCS      Nerve / Sites Muscle Latency Amplitude Amp % Duration Segments Distance Lat Diff Velocity     ms mV % ms  mm ms m/s   L Peroneal - EDB      Ankle EDB 3.70 1.9 100 5.16 Ankle - EDB 80        Fib head EDB 14.01 1.1 57.1 6.72 Fib head - Ankle 390 10.31 38      Pop fossa EDB 16.30 1.0 50.4 7.24 Pop fossa - Fib head 90 2.29 39   R Peroneal - EDB      Ankle EDB 4.06 1.4 100 6.04 Ankle - EDB 80        Fib head EDB 13.54 1.1 75.8 6.72 Fib head - Ankle 390 9.48 41      Pop fossa EDB 15.89 1.0 72.4 7.14 Pop fossa - Fib head 90 2.34 38   L Tibial - AH      Ankle AH 5.63 0.7 100 4.11 Ankle - AH 80        Pop fossa AH 18.39 0.4 60.4 5.47 Pop fossa - Ankle 480 12.76 38   R Tibial - AH      Ankle AH 4.84 0.9 100 5.99 Ankle - AH 80        Pop fossa AH 16.56 0.3 31.7 7.03 Pop fossa - Ankle 480 11.72 41               F  Wave      Nerve F Lat M Lat F-M Lat    ms ms ms   L Peroneal - EDB NR NR NR   L Tibial - AH 79.6 7.4 72.2   R Peroneal - EDB 63.4 4.1 59.3   R Tibial - AH  62.7                H Reflex      Nerve H Lat    ms   L Tibial - Soleus 34.4   R Tibial - Soleus 31.5               EMG Examination   The study was performed with a concentric needle electrode. Fibrillation and fasciculation activity is graded from none (0) to continuous (4+). The configuration and recruitment pattern of motor unit action potentials under voluntary control, if not normal, are described below           EMG Summary Table     Spontaneous MUAP Recruitment   Muscle Nerve Roots IA Fib PSW Fasc H.F. Amp Dur. PPP Pattern   L. Tibialis anterior Deep peroneal (Fibular) L4-L5 1+ 1+ 1+ None None N N N N   R. Tibialis anterior Deep peroneal (Fibular) L4-L5 1+ 1+ 1+ None None N N N N   L. Tibialis posterior Tibial L4-L5 1+ 1+ 1+ None None N N N N   R. Tibialis posterior Tibial L4-L5 1+ 1+ 1+ None None N N N N   L. Vastus lateralis Femoral  L2-L4 N None None None None N N N N   R. Vastus lateralis Femoral L2-L4 N None None None None N N N N   L. Gastrocnemius (Medial head) Tibial S1-S2 N None None None None N N N N   R. Gastrocnemius (Medial head) Tibial S1-S2 N None None None None N N N N   L. Biceps femoris (long head) Sciatic (tibial division) L5-S2 N None None None None N N N N   R. Biceps femoris (long head) Sciatic (tibial division) L5-S2 N None None None None N N N N   L. Iliopsoas Femoral L2-L3 N None None None None N N N N   R. Iliopsoas Femoral L2-L3 N None None None None N N N N   L. Gluteus celina Inferior gluteal L5-S2 N None None None None N N N N   R. Gluteus celina Inferior gluteal L5-S2 N None None None None N N N N       Summary    The motor conduction test had results outside of the specified normal range in all 4 of the tested nerves:  • In the L Peroneal - EDB study  o the peak amplitude result was reduced for Ankle stimulation  o the take off velocity result was reduced for Fib head - Ankle segment  • In the R Peroneal - EDB study  o the peak amplitude result was reduced for Ankle stimulation  o the take off velocity result was reduced for Pop fossa - Fib head segment  • In the L Tibial - AH study  o the peak amplitude result was reduced for Ankle stimulation  o the take off velocity result was reduced for Pop fossa - Ankle segment  • In the R Tibial - AH study  o the peak amplitude result was reduced for Ankle stimulation    The sensory conduction test had results outside of the specified normal range in all 4 of the tested nerves:  • In the L Sural - Ankle (Calf) study  o the response was considered absent for Calf stimulation  • In the R Sural - Ankle (Calf) study  o the response was considered absent for Calf stimulation  • In the L Superficial peroneal - Ankle study  o the response was considered absent for Lat leg stimulation  • In the R Superficial peroneal - Ankle study  o the response was considered absent for Lat leg  stimulation    The F wave study was performed on 4 nerve(s). The results were normal in 2 nerve(s): L Tibial - AH, R Peroneal - EDB. Results outside the specified normal range were found in 2 nerve(s), as follows:  • In the L Peroneal - EDB study  o the response was considered absent  • In the R Tibial - AH study  o cursor 1 latency result was increased    The H reflex study was normal in all 2 of the tested nerves: L Tibial - Soleus, R Tibial - Soleus.    The needle EMG examination was performed in 14 muscles. It was normal in 10 muscle(s): L. Vastus lateralis, R. Vastus lateralis, L. Gastrocnemius (Medial head), R. Gastrocnemius (Medial head), L. Biceps femoris (long head), R. Biceps femoris (long head), L. Iliopsoas, R. Iliopsoas, L. Gluteus celina, R. Gluteus celina. The study was abnormal in 4 muscle(s), with the following distribution:  • Abnormal spontaneous/insertional activity was found in L. Tibialis anterior, R. Tibialis anterior, L. Tibialis posterior, R. Tibialis posterior.           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

## 2022-04-26 ENCOUNTER — TELEPHONE (OUTPATIENT)
Dept: NEUROLOGY | Facility: CLINIC | Age: 67
End: 2022-04-26

## 2022-04-26 DIAGNOSIS — G62.9 PERIPHERAL POLYNEUROPATHY: Primary | ICD-10-CM

## 2022-04-26 RX ORDER — DULOXETIN HYDROCHLORIDE 30 MG/1
30 CAPSULE, DELAYED RELEASE ORAL 2 TIMES DAILY
Qty: 60 CAPSULE | Refills: 2 | Status: SHIPPED | OUTPATIENT
Start: 2022-04-26 | End: 2022-08-11 | Stop reason: SDUPTHER

## 2022-04-26 NOTE — TELEPHONE ENCOUNTER
Caller: JACOB     Relationship: PT    Best call back number:  [phone]    What medications are you currently taking:   Current Outpatient Medications on File Prior to Visit   Medication Sig Dispense Refill   • aspirin (ASPIR-LOW) 81 MG EC tablet Take 1 tablet by mouth daily.     • DULoxetine (CYMBALTA) 60 MG capsule Take 1 capsule by mouth 2 (Two) Times a Day. 60 capsule 5   • fluticasone (FLONASE) 50 MCG/ACT nasal spray 1 spray into the nostril(s) as directed by provider Daily. 3 bottle 3   • Misc Natural Products (OSTEO BI-FLEX ADV DOUBLE ST) tablet Take 1 tablet by mouth daily.     • Multiple Vitamin tablet Take 1 tablet by mouth daily.     • Omega-3 Fatty Acids (FISH OIL) 600 MG capsule Take  by mouth daily.     • Psyllium Husk powder Take 15 mL by mouth daily. Mix in 6 ounces of water or juice     • shark liver oil-cocoa butter (PREPARATION H) 0.25-3-85.5 % suppository Preparation H 0.25-3-85.5 % Rectal Suppository; Patient Sig: Preparation H 0.25-3-85.5 % Rectal Suppository USE AS DIRECTED.  In the evening as needed; 0; 16-Apr-2014; Active       No current facility-administered medications on file prior to visit.          When did you start taking these medications: PT HAS BEEN TAKING THIS IN A LOWER DOSE. PT WAS TAKING THIS STOPPED FOR A DAY AND THEN TOOK FOR ANOTHER DAY AND THE SIDE EFFECTS CAME BACK.     Which medication are you concerned about: DULoxetine (CYMBALTA) 60 MG capsule     Who prescribed you this medication: DR. HUMPHRIES     What are your concerns: PT EXP. DIZZINESS, NAUSEA, RINGING EARS, SWEATING.     How long have you had these concerns: SINCE THE INCREASE.     PTS STATES THAT THIS IS HELPING HIM HE'S JUST HAVING SOME SIDE EFFECTS. ASKING FOR DECREASE IN DOSAGE. PT IS ASKING IF THIS COULD BE DECREASED IN MG TAKING TWICE DAILY. PT IS ASKING IF THIS COULD BE PRESCRIBED @ 30 MG TWICE DAILY.

## 2022-06-15 ENCOUNTER — OFFICE VISIT (OUTPATIENT)
Dept: FAMILY MEDICINE CLINIC | Facility: CLINIC | Age: 67
End: 2022-06-15

## 2022-06-15 VITALS
OXYGEN SATURATION: 96 % | SYSTOLIC BLOOD PRESSURE: 132 MMHG | DIASTOLIC BLOOD PRESSURE: 72 MMHG | WEIGHT: 266.8 LBS | BODY MASS INDEX: 34.24 KG/M2 | HEART RATE: 68 BPM | HEIGHT: 74 IN

## 2022-06-15 DIAGNOSIS — Z87.891 PERSONAL HISTORY OF TOBACCO USE, PRESENTING HAZARDS TO HEALTH: ICD-10-CM

## 2022-06-15 DIAGNOSIS — G62.9 PERIPHERAL POLYNEUROPATHY: Primary | ICD-10-CM

## 2022-06-15 DIAGNOSIS — Z12.11 COLON CANCER SCREENING: ICD-10-CM

## 2022-06-15 DIAGNOSIS — L57.0 ACTINIC KERATOSIS OF SCALP: ICD-10-CM

## 2022-06-15 PROBLEM — L98.9 DERMATOLOGIC PROBLEM: Status: ACTIVE | Noted: 2022-06-15

## 2022-06-15 PROCEDURE — 99213 OFFICE O/P EST LOW 20 MIN: CPT | Performed by: FAMILY MEDICINE

## 2022-06-15 NOTE — PROGRESS NOTES
Established Patient Office Visit      Patient Name: Otis Loredo  : 1955   MRN: [id number]   Care Team: Patient Care Team:  Hernan Hilton DO as PCP - General (Family Medicine)    Chief Complaint:    Chief Complaint   Patient presents with   • Peripheral Neuropathy       History of Present Illness: Otis Loredo is a 67 y.o. male who is here today for chief complaint.    HPI    Chronic health conditions:  Atopic rhinitis: Stable on Flonase seasonally  Obesity: His BMI has been maintained around 33 over the past 5+ years  Prostatism: Only mild symptoms  Plantar fasciitis: Compounded along with bilateral pes planus  Peripheral neuropathy: Unknown etiology, has been worsening over the past few years  Arthritis: He takes Osteo Bi-Flex daily     The patient presents today for a routine follow-up on peripheral neuropathy. He is on duloxetine 30 mg twice daily.    The patient reports that his feet are somewhat better between the medication, the [employer] shoes, and new inserts that he had made.    He reports that Dr. Lester had him on 120 mg of duloxetine, and he was able to take that for a couple of months; however, he had a reaction, so they dropped it back to 60 mg. He states he is going to try to go up to 90 mg. He reports that he has been taking it 2 times a day at 30 mg. He notes he may go to 3 times a day, morning, noon, and at night, before he goes to bed. He admits he has not tried it yet; however, he has been thinking about it. He reports he will be going to see him in a couple of more months. He states he did the test on him and he was told that he did have the peripheral neuropathy. He notes is when he increased the dosage, which he liked the dosage. He reports that it was really helping pretty good, but then he had the reaction. He admits he is still doing okay. He states that it was pretty bad when he came in and he could hardly walk at his last office visit.    He reports that he has a  little place on the top of his head, and it is rough feeling. He states that he does not see a dermatologist.    He reports that he quit smoking in 2013.    This patient is accompanied by their self who contributes to the history of their care.    The following portions of the patient's history were reviewed and updated as appropriate: allergies, current medications, past family history, past medical history, past social history, past surgical history and problem list.    Subjective      Review of Systems:   Review of Systems - See HPI    Past Medical History:   Past Medical History:   Diagnosis Date   • Allergic seasonal   • Allergic rhinitis Lifelong   • Cholelithiasis    • CTS (carpal tunnel syndrome)     Improved with surgical repair   • Difficulty walking    • Flat foot Adulthood    Bilateral- improved with orthotic inserts    • GERD (gastroesophageal reflux disease)    • Hemorrhoids    • HL (hearing loss)    • Low back pain    • Obesity     BMI 31 - body weight 225   • Plantar fasciitis     Severe symptoms relieved with new orthotics   • Scarlet fever    • Varicose veins of both lower extremities Adulthood    Ankle irritability       Past Surgical History:   Past Surgical History:   Procedure Laterality Date   • APPENDECTOMY     • CARPAL TUNNEL RELEASE Bilateral      Dr. Pacheco   • CHOLECYSTECTOMY     • TONSILLECTOMY         Family History:   Family History   Problem Relation Age of Onset   • Heart attack Mother    • Arthritis Mother    • Breast cancer Mother    • Hypertension Mother    • Obesity Mother         Morbid   • Stroke Mother    • Sick sinus syndrome Mother         Pacemaker   • Pneumonia Mother          age 79   • Asthma Mother    • Cancer Mother    • Heart disease Mother    • Migraines Mother    • Seizures Mother    • Allergies Father    • Alzheimer's disease Father          age 84   • Obesity Father         Morbid   • Valvular heart disease Father     • Asthma Father    • Heart disease Father    • Neuropathy Father    • Diabetes Sister    • Gout Sister    • Obesity Sister         Morbid   • Allergies Son    • Hyperlipidemia Son    • No Known Problems Brother    • Migraines Sister    • Cancer Sister         breast cancer       Social History:   Social History     Socioeconomic History   • Marital status:    Tobacco Use   • Smoking status: Former Smoker     Packs/day: 1.00     Years: 42.00     Pack years: 42.00     Types: Cigarettes, Cigarettes     Start date: 1971     Quit date: 2013     Years since quittin.4   • Smokeless tobacco: Never Used   • Tobacco comment: Uses electronic cigarette   Vaping Use   • Vaping Use: Former   Substance and Sexual Activity   • Alcohol use: Never   • Drug use: Never   • Sexual activity: Yes     Partners: Female     Birth control/protection: None       Tobacco History:   Social History     Tobacco Use   Smoking Status Former Smoker   • Packs/day: 1.00   • Years: 42.00   • Pack years: 42.00   • Types: Cigarettes, Cigarettes   • Start date: 1971   • Quit date: 2013   • Years since quittin.4   Smokeless Tobacco Never Used   Tobacco Comment    Uses electronic cigarette       Medications:     Current Outpatient Medications:   •  aspirin (aspirin) 81 MG EC tablet, Take 1 tablet by mouth daily., Disp: , Rfl:   •  DULoxetine (Cymbalta) 30 MG capsule, Take 1 capsule by mouth 2 (Two) Times a Day., Disp: 60 capsule, Rfl: 2  •  fluticasone (FLONASE) 50 MCG/ACT nasal spray, 1 spray into the nostril(s) as directed by provider Daily., Disp: 3 bottle, Rfl: 3  •  Misc Natural Products (OSTEO BI-FLEX ADV DOUBLE ST) tablet, Take 1 tablet by mouth daily., Disp: , Rfl:   •  Multiple Vitamin tablet, Take 1 tablet by mouth daily., Disp: , Rfl:   •  Omega-3 Fatty Acids (FISH OIL) 600 MG capsule, Take  by mouth daily., Disp: , Rfl:   •  Psyllium Husk powder, Take 15 mL by mouth daily. Mix in 6 ounces of water or juice, Disp:  ", Rfl:   •  shark liver oil-cocoa butter (PREPARATION H) 0.25-3-85.5 % suppository, Preparation H 0.25-3-85.5 % Rectal Suppository; Patient Sig: Preparation H 0.25-3-85.5 % Rectal Suppository USE AS DIRECTED.  In the evening as needed; 0; 16-Apr-2014; Active, Disp: , Rfl:     Allergies:   Allergies   Allergen Reactions   • Nicoderm [Nicotine] Dermatitis       Objective   Objective     Physical Exam:  Vital Signs:   Vitals:    06/15/22 1118   BP: 132/72   Pulse: 68   SpO2: 96%   Weight: 121 kg (266 lb 12.8 oz)   Height: 189.2 cm (74.49\")     Body mass index is 33.81 kg/m².     Physical Exam  Nursing note reviewed  Const: NAD, A&Ox4, Pleasant, Cooperative  Eyes: EOMI, no conjunctivitis  ENT: No nasal discharge present, neck supple  Cardiac: Regular rate and rhythm, no cyanosis  Resp: Respiratory rate within normal limits, no increased work of breathing, no audible wheezing or retractions noted  GI: No distention or ascites  MSK: Motor and sensation grossly intact in bilateral upper extremities  Neurologic: CN II-XII grossly intact  Psych: Appropriate mood and behavior.  Skin: Actinic keratosis of scalp  Procedures/Radiology     Procedures  No radiology results for the last 7 days     Assessment & Plan   Assessment / Plan      Assessment/Plan:   Problems Addressed This Visit  Diagnoses and all orders for this visit:    1. Peripheral polyneuropathy (Primary)    2. Personal history of tobacco use, presenting hazards to health  Comments:  42 pack years, quit in 2013  Orders:  -      CT Chest Low Dose Cancer Screening WO; Future    3. Colon cancer screening  -     Ambulatory Referral For Screening Colonoscopy    4. Actinic keratosis of scalp  -     Ambulatory Referral to Dermatology      Problem List Items Addressed This Visit        Neuro    Peripheral polyneuropathy - Primary (Chronic)      Other Visit Diagnoses     Personal history of tobacco use, presenting hazards to health        42 pack years, quit in 2013    " Relevant Orders     CT Chest Low Dose Cancer Screening WO    Colon cancer screening        Relevant Orders    Ambulatory Referral For Screening Colonoscopy    Actinic keratosis of scalp        Relevant Orders    Ambulatory Referral to Dermatology (Completed)              There are no Patient Instructions on file for this visit.      Return in about 6 months (around 12/1/2022) for Medicare Wellness.    DO SARAH Machado RD  [facility]  [address]  [facility] [address]  Fax [phone]  Phone [phone]      Transcribed from ambient dictation for Hernan Hilton DO by JOANNA COCHRAN.  06/15/22   13:52 EDT    Patient verbalized consent to the visit recording.

## 2022-08-11 ENCOUNTER — TELEPHONE (OUTPATIENT)
Dept: NEUROLOGY | Facility: CLINIC | Age: 67
End: 2022-08-11

## 2022-08-11 DIAGNOSIS — G62.9 PERIPHERAL POLYNEUROPATHY: ICD-10-CM

## 2022-08-11 RX ORDER — DULOXETIN HYDROCHLORIDE 30 MG/1
30 CAPSULE, DELAYED RELEASE ORAL 3 TIMES DAILY
Qty: 90 CAPSULE | Refills: 2 | Status: SHIPPED | OUTPATIENT
Start: 2022-08-11 | End: 2022-10-21 | Stop reason: SDUPTHER

## 2022-08-11 NOTE — TELEPHONE ENCOUNTER
I did call Kate to see about increasing the doseage to 60mg BID instead as insurance will likely kick back on prescribing it 3xs a day,   however she reminded me he could not tolerate the Duloxetine at the 120mg a day so really feels like the 30mg 3 xs a day he could tolerate and would carry him over throughout the day.    Notified will pend it to  and see what his insurance/pharmacy says then go from there.

## 2022-08-11 NOTE — TELEPHONE ENCOUNTER
Caller: Kate Loredo    Relationship: Emergency Contact    Best call back number: [phone]    Requested Prescriptions:   Requested Prescriptions     Pending Prescriptions Disp Refills   • DULoxetine (Cymbalta) 30 MG capsule 60 capsule 2     Sig: Take 1 capsule by mouth 2 (Two) Times a Day.        Pharmacy where request should be sent: [facility] [address] [place] [address]. [place] - [phone]  - [phone] FX     Additional details provided by patient: ASKING IF PROVIDER CAN PRESCRIBE IT FOR 30 MG 3 TIMES A DAY?  PT HAS ONLY  2 PILLS LEFT  (ENOUGH FOR TOMORROW)    Does the patient have less than a 3 day supply:  [x] Yes  [] No    Nasir Beck   08/11/22 09:23 EDT

## 2022-09-13 ENCOUNTER — HOSPITAL ENCOUNTER (EMERGENCY)
Facility: HOSPITAL | Age: 67
End: 2022-09-13

## 2022-09-13 ENCOUNTER — APPOINTMENT (OUTPATIENT)
Dept: CT IMAGING | Facility: HOSPITAL | Age: 67
End: 2022-09-13

## 2022-09-13 ENCOUNTER — HOSPITAL ENCOUNTER (EMERGENCY)
Facility: HOSPITAL | Age: 67
Discharge: HOME | End: 2022-09-13
Attending: EMERGENCY MEDICINE | Admitting: EMERGENCY MEDICINE

## 2022-09-13 VITALS
BODY MASS INDEX: 34.46 KG/M2 | HEART RATE: 92 BPM | SYSTOLIC BLOOD PRESSURE: 139 MMHG | DIASTOLIC BLOOD PRESSURE: 82 MMHG | WEIGHT: 260 LBS | HEIGHT: 73 IN | OXYGEN SATURATION: 94 % | TEMPERATURE: 97.9 F | RESPIRATION RATE: 18 BRPM

## 2022-09-13 DIAGNOSIS — S22.42XA CLOSED FRACTURE OF MULTIPLE RIBS OF LEFT SIDE, INITIAL ENCOUNTER: Primary | ICD-10-CM

## 2022-09-13 DIAGNOSIS — S50.312A ABRASION OF LEFT ELBOW, INITIAL ENCOUNTER: ICD-10-CM

## 2022-09-13 PROCEDURE — 99283 EMERGENCY DEPT VISIT LOW MDM: CPT

## 2022-09-13 PROCEDURE — 71250 CT THORAX DX C-: CPT

## 2022-09-13 PROCEDURE — 90471 IMMUNIZATION ADMIN: CPT | Performed by: EMERGENCY MEDICINE

## 2022-09-13 PROCEDURE — 90715 TDAP VACCINE 7 YRS/> IM: CPT | Performed by: EMERGENCY MEDICINE

## 2022-09-13 PROCEDURE — 25010000002 TETANUS-DIPHTH-ACELL PERTUSSIS 5-2.5-18.5 LF-MCG/0.5 SUSPENSION PREFILLED SYRINGE: Performed by: EMERGENCY MEDICINE

## 2022-09-13 RX ORDER — HYDROCODONE BITARTRATE AND ACETAMINOPHEN 5; 325 MG/1; MG/1
1 TABLET ORAL ONCE
Status: COMPLETED | OUTPATIENT
Start: 2022-09-13 | End: 2022-09-13

## 2022-09-13 RX ORDER — HYDROCODONE BITARTRATE AND ACETAMINOPHEN 5; 325 MG/1; MG/1
1 TABLET ORAL EVERY 6 HOURS PRN
Qty: 12 TABLET | Refills: 0 | Status: SHIPPED | OUTPATIENT
Start: 2022-09-13 | End: 2022-10-21

## 2022-09-13 RX ADMIN — TETANUS TOXOID, REDUCED DIPHTHERIA TOXOID AND ACELLULAR PERTUSSIS VACCINE, ADSORBED 0.5 ML: 5; 2.5; 8; 8; 2.5 SUSPENSION INTRAMUSCULAR at 16:38

## 2022-09-13 RX ADMIN — HYDROCODONE BITARTRATE AND ACETAMINOPHEN 1 TABLET: 5; 325 TABLET ORAL at 16:37

## 2022-09-14 NOTE — ED PROVIDER NOTES
Subjective   PIT    67-year-old male presents for evaluation of left-sided rib pain.  He states that this morning at approximately 11 AM he had a mechanical trip and fall and fell awkwardly onto his left side.  No LOC.  No neck pain.  He is not anticoagulated.  He states that he suffered a small abrasion to his posterior left elbow as a result of the fall.  However, his main complaint is left-sided rib pain that is worse with deep inspiration and movement.  He was concerned about a potential rib fracture and came to the ED to be evaluated.  He currently rates his pain at 7 out of 10 in severity.  He has no other complaints at this time.  He denies any pain to his left elbow.  Unknown tetanus status.          Review of Systems   Musculoskeletal:        Left-sided rib pain   Skin: Positive for wound.   All other systems reviewed and are negative.      Past Medical History:   Diagnosis Date   • Allergic seasonal   • Allergic rhinitis Lifelong   • Cholelithiasis    • CTS (carpal tunnel syndrome) 2005    Improved with surgical repair   • Difficulty walking    • Flat foot Adulthood    Bilateral- improved with orthotic inserts    • GERD (gastroesophageal reflux disease)    • Hemorrhoids 2013   • HL (hearing loss)    • Low back pain    • Obesity 2015    BMI 31 - body weight 225   • Plantar fasciitis 2015    Severe symptoms relieved with new orthotics   • Scarlet fever 1962   • Varicose veins of both lower extremities Adulthood    Ankle irritability       Allergies   Allergen Reactions   • Nicoderm [Nicotine] Dermatitis       Past Surgical History:   Procedure Laterality Date   • APPENDECTOMY  1975   • CARPAL TUNNEL RELEASE Bilateral 2005     Dr. Pacheco   • CHOLECYSTECTOMY  1975   • TONSILLECTOMY  1965       Family History   Problem Relation Age of Onset   • Heart attack Mother    • Arthritis Mother    • Breast cancer Mother    • Hypertension Mother    • Obesity Mother         Morbid   • Stroke Mother    • Sick sinus  syndrome Mother         Pacemaker   • Pneumonia Mother          age 79   • Asthma Mother    • Cancer Mother    • Heart disease Mother    • Migraines Mother    • Seizures Mother    • Allergies Father    • Alzheimer's disease Father          age 84   • Obesity Father         Morbid   • Valvular heart disease Father    • Asthma Father    • Heart disease Father    • Neuropathy Father    • Diabetes Sister    • Gout Sister    • Obesity Sister         Morbid   • Allergies Son    • Hyperlipidemia Son    • No Known Problems Brother    • Migraines Sister    • Cancer Sister         breast cancer       Social History     Socioeconomic History   • Marital status:    Tobacco Use   • Smoking status: Former Smoker     Packs/day: 1.00     Years: 42.00     Pack years: 42.00     Types: Cigarettes, Cigarettes     Start date: 1971     Quit date: 2013     Years since quittin.7   • Smokeless tobacco: Never Used   • Tobacco comment: Uses electronic cigarette   Vaping Use   • Vaping Use: Former   Substance and Sexual Activity   • Alcohol use: Never   • Drug use: Never   • Sexual activity: Yes     Partners: Female     Birth control/protection: None           Objective   Physical Exam  Vitals and nursing note reviewed.   Constitutional:       Appearance: He is well-developed. He is not diaphoretic.      Comments: Nontoxic-appearing male   HENT:      Head: Normocephalic and atraumatic.   Neck:      Vascular: No JVD.      Comments: No midline cervical spine tenderness present, no step-off or deformity noted  Cardiovascular:      Rate and Rhythm: Normal rate and regular rhythm.      Heart sounds: Normal heart sounds. No murmur heard.    No friction rub. No gallop.   Pulmonary:      Effort: Pulmonary effort is normal. No respiratory distress.      Breath sounds: Normal breath sounds. No wheezing or rales.   Abdominal:      General: Bowel sounds are normal. There is no distension.      Palpations: Abdomen is soft. There  is no mass.      Tenderness: There is no abdominal tenderness. There is no guarding.   Musculoskeletal:         General: Normal range of motion.      Comments: Point tenderness noted to posterior aspect of left rib cage with no palpable crepitus present    Normal and painless range of motion of left elbow   Skin:     General: Skin is warm and dry.      Comments: Superficial abrasion noted to posterior aspect of left elbow   Neurological:      Mental Status: He is alert and oriented to person, place, and time.      Comments: Normal gait, neurovascularly intact distally in all 4 extremities with bounding distal pulses and normal sensation, normal  strength in left hand   Psychiatric:         Mood and Affect: Mood normal.         Thought Content: Thought content normal.         Judgment: Judgment normal.         Procedures           ED Course  ED Course as of 09/14/22 0024   Tue Sep 13, 2022   1610 67-year-old male presents for evaluation of left-sided rib pain.  He states that at approximately 11 AM he had a mechanical trip and fall and fell awkwardly on his left side.  No LOC.  No neck pain.  He is not anticoagulated.  He suffered a small abrasion to his posterior left elbow as a result of the fall.  However, his main complaint is left-sided rib pain that is worse with movement and deep inspiration.  On arrival, the patient is nontoxic-appearing.  Pain control provided.  Tetanus updated.  No palpable crepitus or signs of trauma noted on physical exam.  We will obtain imaging, and we will reassess following initial interventions. [DD]   3075 After reviewing the patient's chest CT findings, I feel that he is appropriate for outpatient management.  We will discharge him home with an incentive spirometer and a short course of Norco for pain control.  He will follow-up with his primary care physician within the next week.  Agreeable with plan and given appropriate strict return precautions. [DD]      ED Course User  "Index  [DD] Otis Weaver MD                No results found for this or any previous visit (from the past 24 hour(s)).  Note: In addition to lab results from this visit, the labs listed above may include labs taken at another facility or during a different encounter within the last 24 hours. Please correlate lab times with ED admission and discharge times for further clarification of the services performed during this visit.    CT Chest Without Contrast Diagnostic   Final Result       1. Subtle deformities of the posterior left 10th and 11th ribs, not   clearly seen as rib fractures, and possibly representing old trauma.   Mild focal pleural thickening and trace effusion at this location   suggests these may be nondisplaced recent rib fractures instead.   2. No evidence of acute chest trauma or other active chest pathology   elsewhere.       This report was finalized on 9/13/2022 4:11 PM by Dr. Edenilson Serrano MD.            Vitals:    09/13/22 1502   BP: 139/82   BP Location: Left arm   Patient Position: Sitting   Pulse: 92   Resp: 18   Temp: 97.9 °F (36.6 °C)   TempSrc: Oral   SpO2: 94%   Weight: 118 kg (260 lb)   Height: 185.4 cm (73\")     Medications   HYDROcodone-acetaminophen (NORCO) 5-325 MG per tablet 1 tablet (1 tablet Oral Given 9/13/22 1637)   Tetanus-Diphth-Acell Pertussis (BOOSTRIX) injection 0.5 mL (0.5 mL Intramuscular Given 9/13/22 1638)     ECG/EMG Results (last 24 hours)     ** No results found for the last 24 hours. **        No orders to display                          TARSHA reviewed by Otis Weaver MD       [facility]    Final diagnoses:   Closed fracture of multiple ribs of left side, initial encounter   Abrasion of left elbow, initial encounter       ED Disposition  ED Disposition     ED Disposition   Discharge    Condition   Stable    Comment   --             Hernan Hilton,   [address] [id number] [phone]    In 1 week           Medication List      New " Prescriptions    HYDROcodone-acetaminophen 5-325 MG per tablet  Commonly known as: NORCO  Take 1 tablet by mouth Every 6 (Six) Hours As Needed for Moderate Pain.           Where to Get Your Medications      These medications were sent to [facility] [address] [place] [address] - [address] [place] - [phone]  - [phone]   [address] [facility] [address]    Phone: [phone]   · HYDROcodone-acetaminophen 5-325 MG per tablet          Otis Weaver MD  09/14/22 0027       Otis Weaver MD  09/19/22 7529

## 2022-10-17 NOTE — TELEPHONE ENCOUNTER
Called holter results to pt per TGF rare innocent PAC, otherwise normal.  Pt verb understanding.   no edema,  no murmurs,  regular rate and rhythm , no edema.

## 2022-10-21 ENCOUNTER — OFFICE VISIT (OUTPATIENT)
Dept: NEUROLOGY | Facility: CLINIC | Age: 67
End: 2022-10-21

## 2022-10-21 VITALS
SYSTOLIC BLOOD PRESSURE: 118 MMHG | DIASTOLIC BLOOD PRESSURE: 66 MMHG | BODY MASS INDEX: 34.78 KG/M2 | RESPIRATION RATE: 18 BRPM | HEART RATE: 72 BPM | HEIGHT: 73 IN | WEIGHT: 262.4 LBS | TEMPERATURE: 97.5 F | OXYGEN SATURATION: 96 %

## 2022-10-21 DIAGNOSIS — G62.9 PERIPHERAL POLYNEUROPATHY: Primary | Chronic | ICD-10-CM

## 2022-10-21 PROCEDURE — 99213 OFFICE O/P EST LOW 20 MIN: CPT | Performed by: PSYCHIATRY & NEUROLOGY

## 2022-10-21 RX ORDER — DULOXETIN HYDROCHLORIDE 30 MG/1
30 CAPSULE, DELAYED RELEASE ORAL 3 TIMES DAILY
Qty: 270 CAPSULE | Refills: 3 | Status: SHIPPED | OUTPATIENT
Start: 2022-10-21 | End: 2023-10-21

## 2022-10-21 NOTE — PROGRESS NOTES
"Chief Complaint  No chief complaint on file.    Subjective        Otis Loredo presents to [facility]     History of Present Illness    67 y.o. male returns in follow up.  Last visit on 3/29/22 performed EMG/NCS, Cymbalta 30 mg BID. .      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      Cymbalta 60 mg am and 30 pm controlling discomfort.        Problem history:     Sx worsened in last 4 years.       Pain in heels bilaterally.  L > R.  Denies weakness.  Left foot 7 - 8/10.  Quality is throbbing.  Worse at end of the day.       Back of ankles feel tight.       Cymbalta decreases N/T.       Reviewed medical records:     Dx of peripheral neuropathy. Increased N/B/T in feet.  Father had neuropathy.       Cymbalta helping with foot pain.       Labs - 11/24/21 A1C 5.49; CBC,CMP - NCS     Objective   Vital Signs:  There were no vitals taken for this visit.  Estimated body mass index is 34.3 kg/m² as calculated from the following:    Height as of 9/13/22: 185.4 cm (73\").    Weight as of 9/13/22: 118 kg (260 lb).          Physical Exam  Eyes:      Extraocular Movements: EOM normal.      Pupils: Pupils are equal, round, and reactive to light.   Neurological:      Mental Status: He is oriented to person, place, and time.      Motor: Motor strength is normal.      Coordination: Finger-Nose-Finger Test normal.      Gait: Gait is intact.      Deep Tendon Reflexes:      Reflex Scores:       Patellar reflexes are 0 on the right side and 0 on the left side.       Achilles reflexes are 0 on the right side and 0 on the left side.  Psychiatric:         Speech: Speech normal.          Neurologic Exam     Mental Status   Oriented to person, place, and time.   Attention: normal. Concentration: normal.   Speech: speech is normal   Level of consciousness: alert  Knowledge: good and consistent with education.   Normal comprehension. "     Cranial Nerves     CN II   Visual fields full to confrontation.   Visual acuity: normal  Right visual field deficit: none  Left visual field deficit: none     CN III, IV, VI   Pupils are equal, round, and reactive to light.  Extraocular motions are normal.   Nystagmus: none   Diplopia: none  Ophthalmoparesis: none  Upgaze: normal  Downgaze: normal  Conjugate gaze: present    CN V   Facial sensation intact.   Right corneal reflex: normal  Left corneal reflex: normal    CN VII   Right facial weakness: none  Left facial weakness: none    CN VIII   Hearing: intact    CN IX, X   Palate: symmetric  Right gag reflex: normal  Left gag reflex: normal    CN XI   Right sternocleidomastoid strength: normal  Left sternocleidomastoid strength: normal    CN XII   Tongue: not atrophic  Fasciculations: absent  Tongue deviation: none    Motor Exam   Muscle bulk: normal  Overall muscle tone: normal  Right arm tone: normal  Left arm tone: normal  Right leg tone: normal  Left leg tone: normal    Strength   Strength 5/5 throughout.     Sensory Exam   Right arm light touch: normal  Left arm light touch: normal  Right leg light touch: decreased from ankle  Left leg light touch: decreased from ankle  Right arm pinprick: normal  Left arm pinprick: normal  Right leg pinprick: decreased from ankle  Left leg pinprick: decreased from ankle    Gait, Coordination, and Reflexes     Gait  Gait: normal    Coordination   Finger to nose coordination: normal    Tremor   Resting tremor: absent  Intention tremor: absent  Action tremor: absent    Reflexes   Reflexes 2+ except as noted.   Right patellar: 0  Left patellar: 0  Right achilles: 0  Left achilles: 0     Result Review :  The following data was reviewed by: Trevor Myrick MD on 10/21/2022:  Common labs    Common Labs 11/24/21 11/24/21 11/24/21 11/24/21 11/24/21 2/22/22    1027 1027 1027 1027 1027    Glucose    102 (A)     BUN    7 (A)     Creatinine    0.89     eGFR Non African Am    86      Sodium    140     Potassium    4.3     Chloride    105     Calcium    9.0     Total Protein      6.9   Albumin    4.30  3.6   Total Bilirubin    0.4     Alkaline Phosphatase    96     AST (SGOT)    19     ALT (SGPT)    14     WBC 6.45        Hemoglobin 14.1        Hematocrit 40.0        Platelets 303        Total Cholesterol   144      Triglycerides   84      HDL Cholesterol   48      LDL Cholesterol    80      Hemoglobin A1C  5.49       PSA     0.833    (A) Abnormal value                      Assessment and Plan   Diagnoses and all orders for this visit:    1. Peripheral polyneuropathy (Primary)             Follow Up   No follow-ups on file.  Patient was given instructions and counseling regarding his condition or for health maintenance advice. Please see specific information pulled into the AVS if appropriate.

## 2022-11-23 ENCOUNTER — LAB (OUTPATIENT)
Dept: LAB | Facility: HOSPITAL | Age: 67
End: 2022-11-23

## 2022-11-23 ENCOUNTER — OFFICE VISIT (OUTPATIENT)
Dept: FAMILY MEDICINE CLINIC | Facility: CLINIC | Age: 67
End: 2022-11-23

## 2022-11-23 VITALS
DIASTOLIC BLOOD PRESSURE: 78 MMHG | SYSTOLIC BLOOD PRESSURE: 110 MMHG | BODY MASS INDEX: 34.62 KG/M2 | OXYGEN SATURATION: 100 % | HEIGHT: 73 IN | WEIGHT: 261.2 LBS | TEMPERATURE: 97.5 F | HEART RATE: 71 BPM

## 2022-11-23 DIAGNOSIS — R35.1 BPH ASSOCIATED WITH NOCTURIA: ICD-10-CM

## 2022-11-23 DIAGNOSIS — E66.09 CLASS 1 OBESITY DUE TO EXCESS CALORIES WITH SERIOUS COMORBIDITY AND BODY MASS INDEX (BMI) OF 33.0 TO 33.9 IN ADULT: ICD-10-CM

## 2022-11-23 DIAGNOSIS — N40.1 BPH ASSOCIATED WITH NOCTURIA: ICD-10-CM

## 2022-11-23 DIAGNOSIS — Z00.00 PREVENTATIVE HEALTH CARE: ICD-10-CM

## 2022-11-23 DIAGNOSIS — Z13.29 SCREENING FOR ENDOCRINE DISORDER: ICD-10-CM

## 2022-11-23 DIAGNOSIS — Z91.81 AT HIGH RISK FOR FALLS: ICD-10-CM

## 2022-11-23 DIAGNOSIS — Z13.220 SCREENING FOR HYPERLIPIDEMIA: ICD-10-CM

## 2022-11-23 DIAGNOSIS — Z87.891 PERSONAL HISTORY OF TOBACCO USE, PRESENTING HAZARDS TO HEALTH: ICD-10-CM

## 2022-11-23 DIAGNOSIS — Z00.00 MEDICARE ANNUAL WELLNESS VISIT, SUBSEQUENT: Primary | ICD-10-CM

## 2022-11-23 LAB
ALBUMIN SERPL-MCNC: 4 G/DL (ref 3.5–5.2)
ALBUMIN/GLOB SERPL: 1.3 G/DL
ALP SERPL-CCNC: 126 U/L (ref 39–117)
ALT SERPL W P-5'-P-CCNC: 12 U/L (ref 1–41)
ANION GAP SERPL CALCULATED.3IONS-SCNC: 8.9 MMOL/L (ref 5–15)
AST SERPL-CCNC: 21 U/L (ref 1–40)
BACTERIA UR QL AUTO: NORMAL /HPF
BILIRUB SERPL-MCNC: 0.3 MG/DL (ref 0–1.2)
BILIRUB UR QL STRIP: NEGATIVE
BUN SERPL-MCNC: 7 MG/DL (ref 8–23)
BUN/CREAT SERPL: 7.7 (ref 7–25)
CALCIUM SPEC-SCNC: 9.1 MG/DL (ref 8.6–10.5)
CHLORIDE SERPL-SCNC: 105 MMOL/L (ref 98–107)
CHOLEST SERPL-MCNC: 157 MG/DL (ref 0–200)
CLARITY UR: CLEAR
CO2 SERPL-SCNC: 24.1 MMOL/L (ref 22–29)
COLOR UR: YELLOW
CREAT SERPL-MCNC: 0.91 MG/DL (ref 0.76–1.27)
EGFRCR SERPLBLD CKD-EPI 2021: 93.5 ML/MIN/1.73
GLOBULIN UR ELPH-MCNC: 3.2 GM/DL
GLUCOSE SERPL-MCNC: 88 MG/DL (ref 65–99)
GLUCOSE UR STRIP-MCNC: NEGATIVE MG/DL
HDLC SERPL-MCNC: 56 MG/DL (ref 40–60)
HGB UR QL STRIP.AUTO: NEGATIVE
HYALINE CASTS UR QL AUTO: NORMAL /LPF
KETONES UR QL STRIP: ABNORMAL
LDLC SERPL CALC-MCNC: 86 MG/DL (ref 0–100)
LDLC/HDLC SERPL: 1.53 {RATIO}
LEUKOCYTE ESTERASE UR QL STRIP.AUTO: ABNORMAL
NITRITE UR QL STRIP: NEGATIVE
PH UR STRIP.AUTO: 5.5 [PH] (ref 5–8)
POTASSIUM SERPL-SCNC: 4.2 MMOL/L (ref 3.5–5.2)
PROT SERPL-MCNC: 7.2 G/DL (ref 6–8.5)
PROT UR QL STRIP: NEGATIVE
RBC # UR STRIP: NORMAL /HPF
REF LAB TEST METHOD: NORMAL
SODIUM SERPL-SCNC: 138 MMOL/L (ref 136–145)
SP GR UR STRIP: 1.02 (ref 1–1.03)
SQUAMOUS #/AREA URNS HPF: NORMAL /HPF
TRIGL SERPL-MCNC: 78 MG/DL (ref 0–150)
TSH SERPL DL<=0.05 MIU/L-ACNC: 1.09 UIU/ML (ref 0.27–4.2)
UROBILINOGEN UR QL STRIP: ABNORMAL
VLDLC SERPL-MCNC: 15 MG/DL (ref 5–40)
WBC # UR STRIP: NORMAL /HPF

## 2022-11-23 PROCEDURE — 1159F MED LIST DOCD IN RCRD: CPT | Performed by: FAMILY MEDICINE

## 2022-11-23 PROCEDURE — 1170F FXNL STATUS ASSESSED: CPT | Performed by: FAMILY MEDICINE

## 2022-11-23 PROCEDURE — G0447 BEHAVIOR COUNSEL OBESITY 15M: HCPCS | Performed by: FAMILY MEDICINE

## 2022-11-23 PROCEDURE — G0439 PPPS, SUBSEQ VISIT: HCPCS | Performed by: FAMILY MEDICINE

## 2022-11-23 PROCEDURE — 1126F AMNT PAIN NOTED NONE PRSNT: CPT | Performed by: FAMILY MEDICINE

## 2022-11-23 PROCEDURE — 84443 ASSAY THYROID STIM HORMONE: CPT

## 2022-11-23 PROCEDURE — 80053 COMPREHEN METABOLIC PANEL: CPT

## 2022-11-23 PROCEDURE — 99397 PER PM REEVAL EST PAT 65+ YR: CPT | Performed by: FAMILY MEDICINE

## 2022-11-23 PROCEDURE — 80061 LIPID PANEL: CPT

## 2022-11-23 PROCEDURE — 81001 URINALYSIS AUTO W/SCOPE: CPT

## 2022-11-23 NOTE — PATIENT INSTRUCTIONS
Avoid caffeine after 3pm and water after 9pm to improve sleep quality.    Advance Care Planning and Advance Directives     You make decisions on a daily basis - decisions about where you want to live, your career, your home, your life. Perhaps one of the most important decisions you face is your choice for future medical care. Take time to talk with your family and your healthcare team and start planning today.  Advance Care Planning is a process that can help you:  Understand possible future healthcare decisions in light of your own experiences  Reflect on those decision in light of your goals and values  Discuss your decisions with those closest to you and the healthcare professionals that care for you  Make a plan by creating a document that reflects your wishes    Surrogate Decision Maker  In the event of a medical emergency, which has left you unable to communicate or to make your own decisions, you would need someone to make decisions for you.  It is important to discuss your preferences for medical treatment with this person while you are in good health.     Qualities of a surrogate decision maker:  Willing to take on this role and responsibility  Knows what you want for future medical care  Willing to follow your wishes even if they don't agree with them  Able to make difficult medical decisions under stressful circumstances    Advance Directives  These are legal documents you can create that will guide your healthcare team and decision maker(s) when needed. These documents can be stored in the electronic medical record.    Living Will - a legal document to guide your care if you have a terminal condition or a serious illness and are unable to communicate. States vary by statute in document names/types, but most forms may include one or more of the following:        -  Directions regarding life-prolonging treatments        -  Directions regarding artificially provided nutrition/hydration        -  Choosing a  healthcare decision maker        -  Direction regarding organ/tissue donation    Durable Power of  for Healthcare - this document names an -in-fact to make medical decisions for you, but it may also allow this person to make personal and financial decisions for you. Please seek the advice of an  if you need this type of document.    **Advance Directives are not required and no one may discriminate against you if you do not sign one.    Medical Orders  Many states allow specific forms/orders signed by your physician to record your wishes for medical treatment in your current state of health. This form, signed in personal communication with your physician, addresses resuscitation and other medical interventions that you may or may not want.      For more information or to schedule a time with a [facility] Advance Care Planning Facilitator contact: [facility] or call [phone] and someone will contact you directly.    Fall Prevention in the Home, Adult  Falls can cause injuries and affect people of all ages. There are many simple things that you can do to make your home safe and to help prevent falls. Ask for help when making these changes, if needed.  What actions can I take to prevent falls?  General instructions  Use good lighting in all rooms. Replace any light bulbs that burn out, turn on lights if it is dark, and use night-lights.  Place frequently used items in easy-to-reach places. Lower the shelves around your home if necessary.  Set up furniture so that there are clear paths around it. Avoid moving your furniture around.  Remove throw rugs and other tripping hazards from the floor.  Avoid walking on wet floors.  Fix any uneven floor surfaces.  Add color or contrast paint or tape to grab bars and handrails in your home. Place contrasting color strips on the first and last steps of staircases.  When you use a stepladder, make sure that it is completely opened and that  the sides and supports are firmly locked. Have someone hold the ladder while you are using it. Do not climb a closed stepladder.  Know where your pets are when moving through your home.  What can I do in the bathroom?     Keep the floor dry. Immediately clean up any water that is on the floor.  Remove soap buildup in the tub or shower regularly.  Use nonskid mats or decals on the floor of the tub or shower.  Attach bath mats securely with double-sided, nonslip rug tape.  If you need to sit down while you are in the shower, use a plastic, nonslip stool.  Install grab bars by the toilet and in the tub and shower. Do not use towel bars as grab bars.  What can I do in the bedroom?  Make sure that a bedside light is easy to reach.  Do not use oversized bedding that reaches the floor.  Have a firm chair that has side arms to use for getting dressed.  What can I do in the kitchen?  Clean up any spills right away.  If you need to reach for something above you, use a sturdy step stool that has a grab bar.  Keep electrical cables out of the way.  Do not use floor polish or wax that makes floors slippery. If you must use wax, make sure that it is non-skid floor wax.  What can I do with my stairs?  Do not leave any items on the stairs.  Make sure that you have a light switch at the top and the bottom of the stairs. Have them installed if you do not have them.  Make sure that there are handrails on both sides of the stairs. Fix handrails that are broken or loose. Make sure that handrails are as long as the staircases.  Install non-slip stair treads on all stairs in your home.  Avoid having throw rugs at the top or bottom of stairs, or secure the rugs with carpet tape to prevent them from moving.  Choose a carpet design that does not hide the edge of steps on the stairs.  Check any carpeting to make sure that it is firmly attached to the stairs. Fix any carpet that is loose or worn.  What can I do on the outside of my home?  Use  bright outdoor lighting.  Regularly repair the edges of walkways and driveways and fix any cracks.  Remove high doorway thresholds.  Trim any shrubbery on the main path into your home.  Regularly check that handrails are securely fastened and in good repair. Both sides of all steps should have handrails.  Install guardrails along the edges of any raised decks or porches.  Clear walkways of debris and clutter, including tools and rocks.  Have leaves, snow, and ice cleared regularly.  Use sand or salt on walkways during winter months.  In the garage, clean up any spills right away, including grease or oil spills.  What other actions can I take?  Wear closed-toe shoes that fit well and support your feet. Wear shoes that have rubber soles or low heels.  Use mobility aids as needed, such as canes, walkers, scooters, and crutches.  Review your medicines with your health care provider. Some medicines can cause dizziness or changes in blood pressure, which increase your risk of falling.  Talk with your health care provider about other ways that you can decrease your risk of falls. This may include working with a physical therapist or  to improve your strength, balance, and endurance.  Where to find more information  Centers for Disease Control and Prevention, [place]: www.cdc.gov  [employer] [place] [employer] Aging: www.isacc.nih.gov  Contact a health care provider if:  You are afraid of falling at home.  You feel weak, drowsy, or dizzy at home.  You fall at home.  Summary  There are many simple things that you can do to make your home safe and to help prevent falls.  Ways to make your home safe include removing tripping hazards and installing grab bars in the bathroom.  Ask for help when making these changes in your home.  This information is not intended to replace advice given to you by your health care provider. Make sure you discuss any questions you have with your health care provider.  Document Revised: 07/21/2021  Document Reviewed: 07/21/2021  [employer] Patient Education © 2022 [employer]      Sit-to-Stand Exercise  The sit-to-stand exercise (also known as the chair stand or chair rise exercise) strengthens your lower body and helps you maintain or improve your mobility and independence. The end goal is to do the sit-to-stand exercise without using your hands. This will be easier as you become stronger. You should always talk with your health care provider before starting any exercise program, especially if you have had recent surgery.  Do the exercise exactly as told by your health care provider and adjust it as directed. It is normal to feel mild stretching, pulling, tightness, or discomfort as you do this exercise, but you should stop right away if you feel sudden pain or your pain gets worse. Do not begin doing this exercise until told by your health care provider.  What the sit-to-stand exercise does  The sit-to-stand exercise helps to strengthen the muscles in your thighs and the muscles in the center of your body that give you stability (core muscles). This exercise is especially helpful if:  You have had knee or hip surgery.  You have trouble getting up from a chair, out of a car, or off the toilet due to muscle weakness.  How to do the sit-to-stand exercise  Sit toward the front edge of a sturdy chair without armrests. Your knees should be bent and your feet should be flat on the floor and shoulder-width apart and underneath your hips.  Place your hands lightly on each side of the seat. Keep your back and neck as straight as possible, with your chest slightly forward.  Breathe in slowly. Lean forward and slightly shift your weight to the front of your feet.  Breathe out as you slowly stand up. Try not to support any weight with your hands.  Stand and pause for a full breath in and out.  Breathe in as you sit down slowly. Tighten your core and abdominal muscles to control your lowering as much as possible. You  should lower yourself back to the chair slowly, not just drop back into the seat.  Breathe out slowly.  Do this exercise 10-15 times. If needed, do it fewer times until you build up strength.  Rest for 1 minute, then do another set of 10-15 repetitions.  To change the difficulty of the sit-to-stand exercise  If the exercise is too difficult, use a chair with sturdy armrests, and push off the armrests to help you come to the standing position. You can also use the armrests to help slowly lower yourself back to sitting. As this gets easier, try to use your arms less. You can also place a firm cushion or pillow on the chair to make the surface higher.  If this exercise is too easy, do not use your arms to help raise or lower yourself. You can also wear a weighted vest, use hand weights, increase your repetitions, or try a lower chair.  General tips  You may feel tired when starting an exercise routine. This is normal.  You may have muscle soreness that lasts a few days. This is normal. As you get stronger, you may not feel muscle soreness.  Use smooth, steady movements.  Do not  hold your breath during strength exercises. This can cause unsafe changes in your blood pressure.  Breathe in slowly through your nose, and breathe out slowly through your mouth.  Summary  Strengthening your lower body is an important step to help you move safely and independently.  The sit-to-stand exercise helps strengthen the muscles in your thighs and core.  You should always talk with your health care provider before starting any exercise program, especially if you have had recent surgery.  This information is not intended to replace advice given to you by your health care provider. Make sure you discuss any questions you have with your health care provider.  Document Revised: 04/10/2022 Document Reviewed: 04/10/2022  [employer] Patient Education © 2022 [employer]    You are due for Shingrix vaccination series ( the newest shingles  vaccine).  It is a two shot series spaced 2-6 months apart. Please get this vaccine series started at your earliest convenience at your local pharmacy to help avoid shingles outbreak. It is more effective than the old Zostavax vaccine and is recommended even if you have had the Zostavax vaccine in the past.  Once the Shingrix series is completed, it does not need to be repeated.   For more information, please look at the website below:  [facility] [place] Shingrix Vaccine Information      Medicare Wellness  Personal Prevention Plan of Service     Date of Office Visit:    Encounter Provider:  Hernan Hilton DO  Place of Service:  [facility]  Patient Name: Otis Loredo  :  1955    As part of the Medicare Wellness portion of your visit today, we are providing you with this personalized preventive plan of services (PPPS). This plan is based upon recommendations of the [place] Preventive Services Task Force (USPSTF) and the Advisory Committee on Immunization Practices (ACIP).    This lists the preventive care services that should be considered, and provides dates of when you are due. Items listed as completed are up-to-date and do not require any further intervention.    Health Maintenance   Topic Date Due    COLORECTAL CANCER SCREENING  Never done    ZOSTER VACCINE (1 of 2) Never done    Pneumococcal Vaccine 65+ (1 - PCV) Never done    AAA SCREEN (ONE-TIME)  Never done    INFLUENZA VACCINE  2022    ANNUAL WELLNESS VISIT  2022    COVID-19 Vaccine (1) 2023 (Originally 1955)    LIPID PANEL  2022    LUNG CANCER SCREENING  2023    TDAP/TD VACCINES (3 - Td or Tdap) 2032    HEPATITIS C SCREENING  Completed       No orders of the defined types were placed in this encounter.      No follow-ups on file.

## 2022-11-23 NOTE — PROGRESS NOTES
The ABCs of the Annual Wellness Visit  Subsequent Medicare Wellness Visit    Chief Complaint   Patient presents with   • Medicare Wellness-subsequent      Subjective    History of Present Illness:  Otis Loredo is a 67 y.o. male who presents for a Subsequent Medicare Wellness Visit.    The patient fractured his ribs approximately 2.5 months ago and it still bothers him when he lays on his left side. He reports that he went to [facility] and had a CT scan that showed 2 fractures. He is able to get in the bed now. He has been taking Tylenol PM at night. He does not take the pain medication that he was prescribed for his ribs unless he absolutely has to.    He is still seeing Dr. Myrick. He had an appointment there a few weeks ago. The patient is doing well with his feet. He is not taking a fiber supplement. He eats vegetables. He admits if he leaves the sweets and tacos, he will lose weight. The patient has regular bowel movements. He needs to lose approximately 50 pounds. He and his wife do some volunteer work. He reports that he does not do dedicated exercise. He used to do that years ago when he was 215 to 220 pounds, but now he does not do them as much. He reports that his health is about the same compared to last year. He reports that he does not have anything to eat today. He gets up to urinate regularly. He reports some nights he is up 4 times some nights he may only be up once or twice. He drinks a lot of coffee and has always done it. He reports that it does not seem like it does not affect his sleep that much. The patient does not feel tired during the day. He denies use of alcohol. He has not had any cigarettes for approximately 10 years or more. He did vape for a while, but he finally gave it up a few years ago, it started bothering his throat.    The patient has not had a colon cancer screening. He reports that he does not want to do his influenza vaccine or pneumonia vaccine today. He admits  his hearing is doing well. He does not eat meat but will eat chicken breast and fish every once in a while. He has not touched red meat for years. He sees a dentist regularly. The patient sees an eye doctor at least once a year. He is catching up with his dental work.    The following portions of the patient's history were reviewed and   updated as appropriate: allergies, current medications, past family history, past medical history, past social history, past surgical history and problem list.    Compared to one year ago, the patient feels his physical   health is the same.    Compared to one year ago, the patient feels his mental   health is the same.    Recent Hospitalizations:  He was not admitted to the hospital during the last year.       Current Medical Providers:  Patient Care Team:  Hernan Hilton DO as PCP - General (Family Medicine)  Trevor Myrick MD as Consulting Physician (Neurology)    Outpatient Medications Prior to Visit   Medication Sig Dispense Refill   • DULoxetine (Cymbalta) 30 MG capsule Take 1 capsule by mouth 3 (Three) Times a Day. 270 capsule 3   • fluticasone (FLONASE) 50 MCG/ACT nasal spray 1 spray into the nostril(s) as directed by provider Daily. 3 bottle 3   • Misc Natural Products (OSTEO BI-FLEX ADV DOUBLE ST) tablet Take 1 tablet by mouth daily.     • Multiple Vitamin tablet Take 1 tablet by mouth daily.     • Omega-3 Fatty Acids (FISH OIL) 600 MG capsule Take  by mouth daily.     • aspirin (aspirin) 81 MG EC tablet Take 1 tablet by mouth daily.     • Psyllium Husk powder Take 15 mL by mouth daily. Mix in 6 ounces of water or juice     • shark liver oil-cocoa butter (PREPARATION H) 0.25-3-85.5 % suppository Preparation H 0.25-3-85.5 % Rectal Suppository; Patient Sig: Preparation H 0.25-3-85.5 % Rectal Suppository USE AS DIRECTED.  In the evening as needed; 0; 16-Apr-2014; Active       No facility-administered medications prior to visit.       No opioid medication identified  "on active medication list. I have reviewed chart for other potential  high risk medication/s and harmful drug interactions in the elderly.          Aspirin is on active medication list. Aspirin use is indicated based on review of current medical condition/s. Pros and cons of this therapy have been discussed today. Benefits of this medication outweigh potential harm.  Patient has been encouraged to continue taking this medication.  .      Patient Active Problem List   Diagnosis   • Atopic rhinitis   • Pes planus   • Hemorrhoids   • Adiposity   • BPH associated with nocturia   • Fam hx-ischem heart disease   • Preventative health care   • Plantar fasciitis   • Tinnitus of both ears   • Low back pain   • Palpitations   • Peripheral polyneuropathy   • Dermatologic problem     Advance Care Planning  Advance Directive is not on file.  ACP discussion was held with the patient during this visit. Patient does not have an advance directive, information provided.          Objective    Vitals:    11/23/22 0816   BP: 110/78   BP Location: Left arm   Patient Position: Sitting   Cuff Size: Adult   Pulse: 71   Temp: 97.5 °F (36.4 °C)   TempSrc: Infrared   SpO2: 100%   Weight: 118 kg (261 lb 3.2 oz)   Height: 185.4 cm (73\")   PainSc: 0-No pain     Estimated body mass index is 34.46 kg/m² as calculated from the following:    Height as of this encounter: 185.4 cm (73\").    Weight as of this encounter: 118 kg (261 lb 3.2 oz).    BMI is >= 30 and <35. (Class 1 Obesity). The following options were offered after discussion;: weight loss educational material (shared in after visit summary) and exercise counseling/recommendations      Does the patient have evidence of cognitive impairment? No  ATTENTION  What is the year: correct  What is the month of the year: correct  What is the day of the week?: correct  What is the date?: correct  MEMORY  Repeat address three times, only score third attempt: [address] [facility], " [place]: 2  HOW MANY ANIMALS DID THE PATIENT NAME  Verbal Fluency -- Animal Names (0-25): 14-16  CLOCK DRAWING  Clock Drawing: All Correct  MEMORY RECALL  Tell me what you remember about that name and address we were repeating at the beginning: 3  ACE TOTAL SCORE  Total ACE Score - <25/30 strongly suggests cognitive impairment; <21/30 almost certainly shows dementia: 19      Physical Exam        Const: NAD, A&Ox4, Pleasant, Cooperative  Eyes: EOMI, no conjunctivitis  ENT: No nasal discharge present, neck supple  Cardiac: Regular rate and rhythm, no cyanosis  Resp: Respiratory rate within normal limits, no increased work of breathing, no audible wheezing or retractions noted  GI: No distention or ascites  MSK: Motor and sensation grossly intact in bilateral upper extremities  Neurologic: CN II-XII grossly intact  Psych: Appropriate mood and behavior.  Skin: Pink, warm, dry    HEALTH RISK ASSESSMENT    Smoking Status:  Social History     Tobacco Use   Smoking Status Former   • Packs/day: 1.00   • Years: 42.00   • Pack years: 42.00   • Types: Cigarettes   • Start date: 1971   • Quit date: 2013   • Years since quittin.8   Smokeless Tobacco Never   Tobacco Comments    Uses electronic cigarette     Alcohol Consumption:  Social History     Substance and Sexual Activity   Alcohol Use Never     Fall Risk Screen:    AYSE Fall Risk Assessment was completed, and patient is at HIGH risk for falls. Assessment completed on:2022    Depression Screening:  PHQ-2/PHQ-9 Depression Screening 2022   Retired PHQ-9 Total Score -   Retired Total Score -   Little Interest or Pleasure in Doing Things 0-->not at all   Feeling Down, Depressed or Hopeless 0-->not at all   PHQ-9: Brief Depression Severity Measure Score 0       Health Habits and Functional and Cognitive Screening:  Functional & Cognitive Status 2022   Do you have difficulty preparing food and eating? No   Do you have difficulty bathing yourself,  getting dressed or grooming yourself? No   Do you have difficulty using the toilet? No   Do you have difficulty moving around from place to place? No   Do you have trouble with steps or getting out of a bed or a chair? No   Current Diet Limited Junk Food   Dental Exam Up to date   Eye Exam Up to date   Exercise (times per week) 0 times per week   Current Exercises Include -   Do you need help using the phone?  No   Are you deaf or do you have serious difficulty hearing?  No   Do you need help with transportation? No   Do you need help shopping? No   Do you need help preparing meals?  No   Do you need help with housework?  No   Do you need help with laundry? No   Do you need help taking your medications? No   Do you need help managing money? No   Do you ever drive or ride in a car without wearing a seat belt? No   Have you felt unusual stress, anger or loneliness in the last month? No   Who do you live with? Spouse   If you need help, do you have trouble finding someone available to you? No   Have you been bothered in the last four weeks by sexual problems? No   Do you have difficulty concentrating, remembering or making decisions? No       Age-appropriate Screening Schedule:  Refer to the list below for future screening recommendations based on patient's age, sex and/or medical conditions. Orders for these recommended tests are listed in the plan section. The patient has been provided with a written plan.    Health Maintenance   Topic Date Due   • ZOSTER VACCINE (1 of 2) Never done   • INFLUENZA VACCINE  08/01/2022   • LIPID PANEL  11/24/2022   • TDAP/TD VACCINES (3 - Td or Tdap) 09/13/2032              Assessment & Plan   CMS Preventative Services Quick Reference  Risk Factors Identified During Encounter  Fall Risk-High or Moderate  Immunizations Discussed/Encouraged (specific Immunizations; Tdap, Influenza, Prevnar 20 (Pneumococcal 20-valent conjugate) and Shingrix  Inactivity/Sedentary  Obesity/Overweight   The  above risks/problems have been discussed with the patient.  Follow up actions/plans if indicated are seen below in the Assessment/Plan Section.  Pertinent information has been shared with the patient in the After Visit Summary.    Diagnoses and all orders for this visit:    1. Medicare annual wellness visit, subsequent (Primary)    2. Preventative health care    3. At high risk for falls  Comments:  Peripheral neuropathy of both feet    4. BPH associated with nocturia    5. Personal history of tobacco use, presenting hazards to health  Comments:  Quit in 2013, 42 pack-years  Orders:  -     [place] aaa screen limited; Future    6. Screening for endocrine disorder  -     Comprehensive Metabolic Panel; Future  -     Urinalysis With Microscopic If Indicated (No Culture) - Urine, Clean Catch; Future  -     TSH; Future    7. Screening for hyperlipidemia  -     Lipid Panel; Future    8. Class 1 obesity due to excess calories with serious comorbidity and body mass index (BMI) of 33.0 to 33.9 in adult    Other orders  -     Psyllium 49 % powder; Take 1 Scoop by mouth Daily. Mix in 8oz  Dispense: 570 g; Refill: 2      Problem List Items Addressed This Visit        Endocrine and Metabolic    Adiposity    Overview     Counseled. Recommended he get in habit of getting on his gym equipment once a day for 1 minute every day for the next 6 weeks, then increasing his activity/time after that. His diet is generally good.            Genitourinary and Reproductive     BPH associated with nocturia    Overview     Declines medication. Usually 2-3x per night, associated with caffeine use and water intake.            Health Encounters    Preventative health care   Other Visit Diagnoses     Medicare annual wellness visit, subsequent    -  Primary    At high risk for falls        Peripheral neuropathy of both feet    Personal history of tobacco use, presenting hazards to health        Quit in 2013, 42 pack-years    Relevant Orders    [place] aaa  screen limited    Screening for endocrine disorder        Relevant Orders    Comprehensive Metabolic Panel    Urinalysis With Microscopic If Indicated (No Culture) - Urine, Clean Catch    TSH    Screening for hyperlipidemia        Relevant Orders    Lipid Panel        1. Medicare annual wellness visit, subsequent  - He is doing well overall. He is up to date on his preventative care. He is due for a colon cancer screening. He declines the influenza vaccine and pneumonia vaccine today. He is due for an abdominal aortic aneurysm screening. He is due for a lung cancer screening.    2. Essential hypertension  - His blood pressure is well controlled at this time. He will continue his current medication regimen.    3. Hyperlipidemia, unspecified hyperlipidemia type  - He will continue his current medication regimen.    4. Urinary frequency  - He will continue to monitor his caffeine and water intake.      The wellness exam has been reviewed in detail.  The patient has been fully counseled on preventative guidelines for vaccines, cancer screenings, and other health maintenance needs.  Functional testing has been performed to assess capacity for independent living and need for other medical interventions.   The patient was counseled on maintaining a lifestyle to promote good health and to minimize chronic diseases.  The patient has been assisted with scheduling healthcare procedures for the coming year and given a written document outlining these recommendations.  -He had CT chest in September for rib injury, no lung nodules. Recommend yearly low-dose CT for lung cancer screening (42-py, quit 2013)  -Recommended one-time AAA screening due to smoking history    Follow Up:   Return in about 1 year (around 11/23/2023) for Medicare Wellness, Annual.     An After Visit Summary and PPPS were made available to the patient.               Transcribed from ambient dictation for Hernan Hilton,  by Christianne Edwards.  11/23/22    09:38 EST    Patient or patient representative verbalized consent to the visit recording.  I have personally performed the services described in this document as transcribed by the above individual, and it is both accurate and complete.

## 2023-10-30 DIAGNOSIS — G62.9 PERIPHERAL POLYNEUROPATHY: Chronic | ICD-10-CM

## 2023-10-30 RX ORDER — DULOXETIN HYDROCHLORIDE 30 MG/1
30 CAPSULE, DELAYED RELEASE ORAL 3 TIMES DAILY
Qty: 270 CAPSULE | Refills: 3 | Status: SHIPPED | OUTPATIENT
Start: 2023-10-30 | End: 2024-10-29

## 2023-10-30 NOTE — TELEPHONE ENCOUNTER
Rx Refill Note  Requested Prescriptions     Pending Prescriptions Disp Refills    DULoxetine (Cymbalta) 30 MG capsule 270 capsule 3     Sig: Take 1 capsule by mouth 3 (Three) Times a Day.      Last filled:  10/21/2022 w/3  Last office visit with prescribing clinician: 10/21/2022      Next office visit with prescribing clinician: 1/26/2024     Pat Suggs MA  10/30/23, 13:49 EDT

## 2023-11-29 ENCOUNTER — LAB (OUTPATIENT)
Dept: LAB | Facility: HOSPITAL | Age: 68
End: 2023-11-29
Payer: MEDICARE

## 2023-11-29 ENCOUNTER — OFFICE VISIT (OUTPATIENT)
Dept: FAMILY MEDICINE CLINIC | Facility: CLINIC | Age: 68
End: 2023-11-29
Payer: MEDICARE

## 2023-11-29 VITALS
HEART RATE: 82 BPM | HEIGHT: 73 IN | BODY MASS INDEX: 33.48 KG/M2 | SYSTOLIC BLOOD PRESSURE: 118 MMHG | WEIGHT: 252.6 LBS | OXYGEN SATURATION: 95 % | DIASTOLIC BLOOD PRESSURE: 68 MMHG

## 2023-11-29 DIAGNOSIS — Z00.00 PREVENTATIVE HEALTH CARE: ICD-10-CM

## 2023-11-29 DIAGNOSIS — R35.1 BPH ASSOCIATED WITH NOCTURIA: ICD-10-CM

## 2023-11-29 DIAGNOSIS — N40.1 BPH ASSOCIATED WITH NOCTURIA: ICD-10-CM

## 2023-11-29 DIAGNOSIS — Z13.0 SCREENING FOR DEFICIENCY ANEMIA: ICD-10-CM

## 2023-11-29 DIAGNOSIS — Z12.5 SCREENING PSA (PROSTATE SPECIFIC ANTIGEN): ICD-10-CM

## 2023-11-29 DIAGNOSIS — Z13.220 SCREENING FOR HYPERLIPIDEMIA: ICD-10-CM

## 2023-11-29 DIAGNOSIS — Z13.29 SCREENING FOR ENDOCRINE DISORDER: ICD-10-CM

## 2023-11-29 DIAGNOSIS — G62.9 PERIPHERAL POLYNEUROPATHY: Chronic | ICD-10-CM

## 2023-11-29 DIAGNOSIS — Z00.00 MEDICARE ANNUAL WELLNESS VISIT, SUBSEQUENT: Primary | ICD-10-CM

## 2023-11-29 DIAGNOSIS — Z87.891 PERSONAL HISTORY OF TOBACCO USE, PRESENTING HAZARDS TO HEALTH: ICD-10-CM

## 2023-11-29 LAB
ALBUMIN SERPL-MCNC: 4.5 G/DL (ref 3.5–5.2)
ALBUMIN/GLOB SERPL: 1.5 G/DL
ALP SERPL-CCNC: 114 U/L (ref 39–117)
ALT SERPL W P-5'-P-CCNC: 14 U/L (ref 1–41)
ANION GAP SERPL CALCULATED.3IONS-SCNC: 10 MMOL/L (ref 5–15)
AST SERPL-CCNC: 18 U/L (ref 1–40)
BILIRUB SERPL-MCNC: 0.3 MG/DL (ref 0–1.2)
BILIRUB UR QL STRIP: NEGATIVE
BUN SERPL-MCNC: 7 MG/DL (ref 8–23)
BUN/CREAT SERPL: 7.4 (ref 7–25)
CALCIUM SPEC-SCNC: 9.4 MG/DL (ref 8.6–10.5)
CHLORIDE SERPL-SCNC: 103 MMOL/L (ref 98–107)
CHOLEST SERPL-MCNC: 151 MG/DL (ref 0–200)
CLARITY UR: CLEAR
CO2 SERPL-SCNC: 26 MMOL/L (ref 22–29)
COLOR UR: YELLOW
CREAT SERPL-MCNC: 0.94 MG/DL (ref 0.76–1.27)
DEPRECATED RDW RBC AUTO: 43.9 FL (ref 37–54)
EGFRCR SERPLBLD CKD-EPI 2021: 89.4 ML/MIN/1.73
ERYTHROCYTE [DISTWIDTH] IN BLOOD BY AUTOMATED COUNT: 12.5 % (ref 12.3–15.4)
GLOBULIN UR ELPH-MCNC: 3.1 GM/DL
GLUCOSE SERPL-MCNC: 105 MG/DL (ref 65–99)
GLUCOSE UR STRIP-MCNC: NEGATIVE MG/DL
HCT VFR BLD AUTO: 41.8 % (ref 37.5–51)
HDLC SERPL-MCNC: 56 MG/DL (ref 40–60)
HGB BLD-MCNC: 14.1 G/DL (ref 13–17.7)
HGB UR QL STRIP.AUTO: NEGATIVE
KETONES UR QL STRIP: NEGATIVE
LDLC SERPL CALC-MCNC: 84 MG/DL (ref 0–100)
LDLC/HDLC SERPL: 1.51 {RATIO}
LEUKOCYTE ESTERASE UR QL STRIP.AUTO: NEGATIVE
MCH RBC QN AUTO: 32.1 PG (ref 26.6–33)
MCHC RBC AUTO-ENTMCNC: 33.7 G/DL (ref 31.5–35.7)
MCV RBC AUTO: 95.2 FL (ref 79–97)
NITRITE UR QL STRIP: NEGATIVE
PH UR STRIP.AUTO: 5.5 [PH] (ref 5–8)
PLATELET # BLD AUTO: 267 10*3/MM3 (ref 140–450)
PMV BLD AUTO: 10.7 FL (ref 6–12)
POTASSIUM SERPL-SCNC: 4.3 MMOL/L (ref 3.5–5.2)
PROT SERPL-MCNC: 7.6 G/DL (ref 6–8.5)
PROT UR QL STRIP: NEGATIVE
PSA SERPL-MCNC: 0.64 NG/ML (ref 0–4)
RBC # BLD AUTO: 4.39 10*6/MM3 (ref 4.14–5.8)
SODIUM SERPL-SCNC: 139 MMOL/L (ref 136–145)
SP GR UR STRIP: 1.02 (ref 1–1.03)
TRIGL SERPL-MCNC: 53 MG/DL (ref 0–150)
TSH SERPL DL<=0.05 MIU/L-ACNC: 1.82 UIU/ML (ref 0.27–4.2)
UROBILINOGEN UR QL STRIP: NORMAL
VLDLC SERPL-MCNC: 11 MG/DL (ref 5–40)
WBC NRBC COR # BLD AUTO: 7.3 10*3/MM3 (ref 3.4–10.8)

## 2023-11-29 PROCEDURE — G0103 PSA SCREENING: HCPCS | Performed by: FAMILY MEDICINE

## 2023-11-29 PROCEDURE — 80053 COMPREHEN METABOLIC PANEL: CPT | Performed by: FAMILY MEDICINE

## 2023-11-29 PROCEDURE — 85027 COMPLETE CBC AUTOMATED: CPT | Performed by: FAMILY MEDICINE

## 2023-11-29 PROCEDURE — 84443 ASSAY THYROID STIM HORMONE: CPT | Performed by: FAMILY MEDICINE

## 2023-11-29 PROCEDURE — 81003 URINALYSIS AUTO W/O SCOPE: CPT | Performed by: FAMILY MEDICINE

## 2023-11-29 PROCEDURE — 80061 LIPID PANEL: CPT | Performed by: FAMILY MEDICINE

## 2023-11-29 NOTE — PROGRESS NOTES
The ABCs of the Annual Wellness Visit  Subsequent Medicare Wellness Visit    Subjective    Otis Loredo is a 68 y.o. male who presents for a Subsequent Medicare Wellness Visit.    The following portions of the patient's history were reviewed and   updated as appropriate: allergies, current medications, past family history, past medical history, past social history, past surgical history, and problem list.    Compared to one year ago, the patient feels his physical   health is the same.    Compared to one year ago, the patient feels his mental   health is the same.    Recent Hospitalizations:  He was not admitted to the hospital during the last year.       Current Medical Providers:  Patient Care Team:  Hernan Hilton DO as PCP - General (Family Medicine)  Trevor Myrick MD as Consulting Physician (Neurology)    Outpatient Medications Prior to Visit   Medication Sig Dispense Refill    DULoxetine (Cymbalta) 30 MG capsule Take 1 capsule by mouth 3 (Three) Times a Day. 270 capsule 3    fluticasone (FLONASE) 50 MCG/ACT nasal spray 1 spray into the nostril(s) as directed by provider Daily. 3 bottle 3    Misc Natural Products (OSTEO BI-FLEX ADV DOUBLE ST) tablet Take 1 tablet by mouth daily.      Multiple Vitamin tablet Take 1 tablet by mouth Daily.      Omega-3 Fatty Acids (FISH OIL) 600 MG capsule Take  by mouth daily.      Psyllium 49 % powder Take 1 Scoop by mouth Daily. Mix in 8oz 570 g 2     No facility-administered medications prior to visit.       No opioid medication identified on active medication list. I have reviewed chart for other potential  high risk medication/s and harmful drug interactions in the elderly.        Aspirin is not on active medication list.  Aspirin use is not indicated based on review of current medical condition/s. Risk of harm outweighs potential benefits.  .    Patient Active Problem List   Diagnosis    Atopic rhinitis    Pes planus    Hemorrhoids    Adiposity    BPH  "associated with nocturia    Fam hx-ischem heart disease    Preventative health care    Plantar fasciitis    Tinnitus of both ears    Low back pain    Palpitations    Peripheral polyneuropathy    Dermatologic problem     Advance Care Planning   Advance Care Planning     Advance Directive is not on file.  ACP discussion was held with the patient during this visit. Patient has an advance directive (not in EMR), copy requested.     Objective    Vitals:    23 0819   BP: 118/68   BP Location: Left arm   Patient Position: Sitting   Cuff Size: Adult   Pulse: 82   SpO2: 95%   Weight: 115 kg (252 lb 9.6 oz)   Height: 185.4 cm (73\")   PainSc: 0-No pain     Estimated body mass index is 33.33 kg/m² as calculated from the following:    Height as of this encounter: 185.4 cm (73\").    Weight as of this encounter: 115 kg (252 lb 9.6 oz).    BMI is >= 30 and <35. (Class 1 Obesity). The following options were offered after discussion;: weight loss educational material (shared in after visit summary), exercise counseling/recommendations, and nutrition counseling/recommendations      Does the patient have evidence of cognitive impairment?  Yes, but reports it is just \"test-taking anxiety\"  ATTENTION  What is the year: correct  What is the month of the year: correct  What is the day of the week?: correct  What is the date?: correct  MEMORY  Repeat address three times, only score third attempt: [address], [place]: 2  HOW MANY ANIMALS DID THE PATIENT NAME  Verbal Fluency -- Animal Names (0-25): 11-13  CLOCK DRAWING  Clock Drawing: All Correct  MEMORY RECALL  Tell me what you remember about that name and address we were repeating at the beginnin  ACE TOTAL SCORE  Total ACE Score - <25/30 strongly suggests cognitive impairment; <21/30 almost certainly shows dementia: 16         HEALTH RISK ASSESSMENT    Smoking Status:  Social History     Tobacco Use   Smoking Status Former    Packs/day: 1.00    " Years: 42.00    Additional pack years: 0.00    Total pack years: 42.00    Types: Cigarettes    Start date: 1971    Quit date: 2013    Years since quitting: 10.9   Smokeless Tobacco Never   Tobacco Comments    Uses electronic cigarette     Alcohol Consumption:  Social History     Substance and Sexual Activity   Alcohol Use Never     Fall Risk Screen:    AYSE Fall Risk Assessment was completed, and patient is at LOW risk for falls.Assessment completed on:2023    Depression Screenin/29/2023     8:22 AM   PHQ-2/PHQ-9 Depression Screening   Little Interest or Pleasure in Doing Things 0-->not at all   Feeling Down, Depressed or Hopeless 0-->not at all   PHQ-9: Brief Depression Severity Measure Score 0       Health Habits and Functional and Cognitive Screenin/29/2023     8:23 AM   Functional & Cognitive Status   Do you have difficulty preparing food and eating? No   Do you have difficulty bathing yourself, getting dressed or grooming yourself? No   Do you have difficulty using the toilet? No   Do you have difficulty moving around from place to place? No   Do you have trouble with steps or getting out of a bed or a chair? No   Current Diet Well Balanced Diet   Dental Exam Up to date   Eye Exam Up to date   Exercise (times per week) 0 times per week   Current Exercises Include No Regular Exercise   Do you need help using the phone?  No   Are you deaf or do you have serious difficulty hearing?  No   Do you need help to go to places out of walking distance? No   Do you need help shopping? No   Do you need help preparing meals?  No   Do you need help with housework?  No   Do you need help with laundry? No   Do you need help taking your medications? No   Do you need help managing money? No   Do you ever drive or ride in a car without wearing a seat belt? No   Have you felt unusual stress, anger or loneliness in the last month? No   Who do you live with? Spouse   If you need help, do you have  trouble finding someone available to you? No   Have you been bothered in the last four weeks by sexual problems? No   Do you have difficulty concentrating, remembering or making decisions? No       Age-appropriate Screening Schedule:  Refer to the list below for future screening recommendations based on patient's age, sex and/or medical conditions. Orders for these recommended tests are listed in the plan section. The patient has been provided with a written plan.    Health Maintenance   Topic Date Due    ZOSTER VACCINE (1 of 2) Never done    AAA SCREEN (ONE-TIME)  Never done    LUNG CANCER SCREENING  09/13/2023    LIPID PANEL  11/23/2023    COVID-19 Vaccine (1) 02/18/2024 (Originally 1955)    INFLUENZA VACCINE  03/31/2024 (Originally 8/1/2023)    Pneumococcal Vaccine 65+ (1 - PCV) 11/29/2024 (Originally 1/10/2022)    ANNUAL WELLNESS VISIT  11/29/2024    BMI FOLLOWUP  11/29/2024    TDAP/TD VACCINES (3 - Td or Tdap) 09/13/2032    HEPATITIS C SCREENING  Completed                  CMS Preventative Services Quick Reference  Risk Factors Identified During Encounter  Hearing Problem:  Will proceed with testing for mild hearing loss, left sided tinnitus  Immunizations Discussed/Encouraged: Shingrix  The above risks/problems have been discussed with the patient.  Pertinent information has been shared with the patient in the After Visit Summary.  An After Visit Summary and PPPS were made available to the patient.    The wellness exam has been reviewed in detail.  The patient has been fully counseled on preventative guidelines for vaccines, cancer screenings, and other health maintenance needs.  Functional testing has been performed to assess capacity for independent living and need for other medical interventions.   The patient was counseled on maintaining a lifestyle to promote good health and to minimize chronic diseases.  The patient has been assisted with scheduling healthcare procedures for the coming year and  "given a written document outlining these recommendations.    Follow Up:   Next Medicare Wellness visit to be scheduled in 1 year.       Additional E&M Note during same encounter follows:  Patient has multiple medical problems which are significant and separately identifiable that require additional work above and beyond the Medicare Wellness Visit.      Chief Complaint  Medicare Wellness-subsequent    Subjective        HPI  Otis Loredo is also being seen today for annual exam.  He takes duloxetine 30 mg 3 times daily from his neurologist Dr. Lester for peripheral neuropathy.  He had tried a higher dose previously but was unable to tolerate it due to side effects.         Objective   Vital Signs:  /68 (BP Location: Left arm, Patient Position: Sitting, Cuff Size: Adult)   Pulse 82   Ht 185.4 cm (73\")   Wt 115 kg (252 lb 9.6 oz)   SpO2 95%   BMI 33.33 kg/m²     Physical Exam   Const: NAD, A&Ox4, Pleasant, Cooperative  Eyes: EOMI, no conjunctivitis  ENT: No nasal discharge present, neck supple  Cardiac: Regular rate and rhythm, no cyanosis  Resp: Respiratory rate within normal limits, no increased work of breathing, no audible wheezing or retractions noted  GI: No distention or ascites  MSK: Motor and sensation grossly intact in bilateral upper extremities  Neurologic: CN II-XII grossly intact                 Assessment and Plan   Diagnoses and all orders for this visit:    1. Medicare annual wellness visit, subsequent (Primary)    2. Preventative health care    3. Screening for endocrine disorder  -     Comprehensive Metabolic Panel; Future  -     Urinalysis With Microscopic If Indicated (No Culture) - Urine, Clean Catch; Future  -     TSH; Future  -     Comprehensive Metabolic Panel  -     Urinalysis With Microscopic If Indicated (No Culture) - Urine, Clean Catch  -     TSH    4. BPH associated with nocturia    5. Peripheral polyneuropathy    6. Personal history of tobacco use, presenting hazards to " health  -     [place] aaa screen limited; Future    7. Screening for deficiency anemia  -     CBC (No Diff); Future  -     CBC (No Diff)    8. Screening for hyperlipidemia  -     Lipid Panel; Future  -     Lipid Panel    9. Screening PSA (prostate specific antigen)  -     PSA Screen; Future  -     PSA Screen        Problem List Items Addressed This Visit          Genitourinary and Reproductive     BPH associated with nocturia    Overview     Declines medication. Usually 2-3x per night, associated with caffeine use and water intake.            Health Encounters    Preventative health care       Neuro    Peripheral polyneuropathy (Chronic)     Other Visit Diagnoses       Medicare annual wellness visit, subsequent    -  Primary    Screening for endocrine disorder        Relevant Orders    Comprehensive Metabolic Panel    Urinalysis With Microscopic If Indicated (No Culture) - Urine, Clean Catch    TSH    Personal history of tobacco use, presenting hazards to health        Relevant Orders    [place] aaa screen limited    Screening for deficiency anemia        Relevant Orders    CBC (No Diff)    Screening for hyperlipidemia        Relevant Orders    Lipid Panel    Screening PSA (prostate specific antigen)        Relevant Orders    PSA Screen           The preventative exam has been reviewed in detail.  The patient has been fully counseled on preventative guidelines for vaccines, cancer screenings, and other health maintenance needs. The patient was counseled on maintaining a lifestyle to promote good health and to minimize chronic diseases.  The patient has been assisted with scheduling healthcare procedures for the coming year and given a written document outlining these recommendations. Age-appropriate screening measures have been ordered for the patient today as indicated above.       Follow Up   Return in about 1 year (around 11/29/2024) for Medicare Wellness, Annual.  Patient was given instructions and counseling regarding  his condition or for health maintenance advice. Please see specific information pulled into the AVS if appropriate.

## 2023-11-29 NOTE — PATIENT INSTRUCTIONS
Advance Care Planning and Advance Directives     You make decisions on a daily basis - decisions about where you want to live, your career, your home, your life. Perhaps one of the most important decisions you face is your choice for future medical care. Take time to talk with your family and your healthcare team and start planning today.  Advance Care Planning is a process that can help you:  Understand possible future healthcare decisions in light of your own experiences  Reflect on those decision in light of your goals and values  Discuss your decisions with those closest to you and the healthcare professionals that care for you  Make a plan by creating a document that reflects your wishes    Surrogate Decision Maker  In the event of a medical emergency, which has left you unable to communicate or to make your own decisions, you would need someone to make decisions for you.  It is important to discuss your preferences for medical treatment with this person while you are in good health.     Qualities of a surrogate decision maker:  Willing to take on this role and responsibility  Knows what you want for future medical care  Willing to follow your wishes even if they don't agree with them  Able to make difficult medical decisions under stressful circumstances    Advance Directives  These are legal documents you can create that will guide your healthcare team and decision maker(s) when needed. These documents can be stored in the electronic medical record.    Living Will - a legal document to guide your care if you have a terminal condition or a serious illness and are unable to communicate. States vary by statute in document names/types, but most forms may include one or more of the following:        -  Directions regarding life-prolonging treatments        -  Directions regarding artificially provided nutrition/hydration        -  Choosing a healthcare decision maker        -  Direction regarding organ/tissue  donation    Durable Power of  for Healthcare - this document names an -in-fact to make medical decisions for you, but it may also allow this person to make personal and financial decisions for you. Please seek the advice of an  if you need this type of document.    **Advance Directives are not required and no one may discriminate against you if you do not sign one.    Medical Orders  Many states allow specific forms/orders signed by your physician to record your wishes for medical treatment in your current state of health. This form, signed in personal communication with your physician, addresses resuscitation and other medical interventions that you may or may not want.      For more information or to schedule a time with a [facility] Advance Care Planning Facilitator contact: [facility] or call [phone] and someone will contact you directly.  You are due for Shingrix vaccination series ( the newest shingles vaccine).  It is a two shot series spaced 2-6 months apart. Please get this vaccine series started at your earliest convenience at your local pharmacy to help avoid shingles outbreak. It is more effective than the old Zostavax vaccine and is recommended even if you have had the Zostavax vaccine in the past.  Once the Shingrix series is completed, it does not need to be repeated.   For more information, please look at the website below:  [facility] Shingrix Vaccine Information      Medicare Wellness  Personal Prevention Plan of Service     Date of Office Visit:    Encounter Provider:  Hernan Hilton DO  Place of Service:  [facility]  Patient Name: tOis Loredo  :  1955    As part of the Medicare Wellness portion of your visit today, we are providing you with this personalized preventive plan of services (PPPS). This plan is based upon recommendations of the [place] Preventive Services Task Force (USPSTF) and the Advisory  Committee on Immunization Practices (ACIP).    This lists the preventive care services that should be considered, and provides dates of when you are due. Items listed as completed are up-to-date and do not require any further intervention.    Health Maintenance   Topic Date Due   • ZOSTER VACCINE (1 of 2) Never done   • AAA SCREEN (ONE-TIME)  Never done   • ANNUAL WELLNESS VISIT  11/23/2023   • LIPID PANEL  11/23/2023   • COVID-19 Vaccine (1) 02/18/2024 (Originally 1955)   • INFLUENZA VACCINE  03/31/2024 (Originally 8/1/2023)   • Pneumococcal Vaccine 65+ (1 - PCV) 11/29/2024 (Originally 1/10/2022)   • BMI FOLLOWUP  11/29/2024   • TDAP/TD VACCINES (3 - Td or Tdap) 09/13/2032   • HEPATITIS C SCREENING  Completed       No orders of the defined types were placed in this encounter.      No follow-ups on file.

## 2024-01-02 ENCOUNTER — HOSPITAL ENCOUNTER (OUTPATIENT)
Dept: ULTRASOUND IMAGING | Facility: HOSPITAL | Age: 69
Discharge: HOME | End: 2024-01-02
Payer: MEDICARE

## 2024-01-02 DIAGNOSIS — Z87.891 PERSONAL HISTORY OF TOBACCO USE, PRESENTING HAZARDS TO HEALTH: ICD-10-CM

## 2024-01-03 ENCOUNTER — HOSPITAL ENCOUNTER (OUTPATIENT)
Dept: ULTRASOUND IMAGING | Facility: HOSPITAL | Age: 69
Discharge: HOME | End: 2024-01-03
Admitting: FAMILY MEDICINE
Payer: MEDICARE

## 2024-01-03 PROCEDURE — 76706 US ABDL AORTA SCREEN AAA: CPT

## 2024-01-26 ENCOUNTER — OFFICE VISIT (OUTPATIENT)
Dept: NEUROLOGY | Facility: CLINIC | Age: 69
End: 2024-01-26
Payer: MEDICARE

## 2024-01-26 VITALS
HEART RATE: 76 BPM | BODY MASS INDEX: 33.13 KG/M2 | OXYGEN SATURATION: 97 % | DIASTOLIC BLOOD PRESSURE: 70 MMHG | HEIGHT: 73 IN | SYSTOLIC BLOOD PRESSURE: 122 MMHG | WEIGHT: 250 LBS

## 2024-01-26 DIAGNOSIS — G62.9 PERIPHERAL POLYNEUROPATHY: Primary | Chronic | ICD-10-CM

## 2024-01-26 PROCEDURE — 1160F RVW MEDS BY RX/DR IN RCRD: CPT | Performed by: PSYCHIATRY & NEUROLOGY

## 2024-01-26 PROCEDURE — 99213 OFFICE O/P EST LOW 20 MIN: CPT | Performed by: PSYCHIATRY & NEUROLOGY

## 2024-01-26 PROCEDURE — 1159F MED LIST DOCD IN RCRD: CPT | Performed by: PSYCHIATRY & NEUROLOGY

## 2024-01-26 RX ORDER — DULOXETIN HYDROCHLORIDE 30 MG/1
30 CAPSULE, DELAYED RELEASE ORAL 3 TIMES DAILY
Qty: 270 CAPSULE | Refills: 3 | Status: SHIPPED | OUTPATIENT
Start: 2024-01-26 | End: 2025-01-25

## 2024-01-26 NOTE — PROGRESS NOTES
"Chief Complaint    Peripheral Neuropathy    Subjective        Otis Loredo presents to [facility]  History of Present Illness    69 y.o. male returns in follow up.  Last visit on 10/21/22 continued Cymbalta.     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        Discomfort in feet 1-2/10.       Tolerating Cymbalta 60 mg am and 30 pm controlling discomfort.        Timing when sitting or lying down at night.   Problem history:      Sx worsened in last 4 years.       Pain in heels bilaterally.  L > R.  Denies weakness.  Left foot 7 - 8/10.  Quality is throbbing.  Worse at end of the day.       Back of ankles feel tight.       Cymbalta decreases N/T.       Reviewed medical records:     Dx of peripheral neuropathy. Increased N/B/T in feet.  Father had neuropathy.       Cymbalta helping with foot pain.       Labs - 11/24/21 A1C 5.49; CBC,CMP - NCS           Objective   Vital Signs:  /70   Pulse 76   Ht 185.4 cm (72.99\")   Wt 113 kg (250 lb)   SpO2 97%   BMI 32.99 kg/m²   Estimated body mass index is 32.99 kg/m² as calculated from the following:    Height as of this encounter: 185.4 cm (72.99\").    Weight as of this encounter: 113 kg (250 lb).       Neurologic Exam     Mental Status   Oriented to person, place, and time.   Attention: normal. Concentration: normal.   Speech: speech is normal   Level of consciousness: alert  Knowledge: good and consistent with education.   Normal comprehension.     Cranial Nerves     CN II   Visual fields full to confrontation.   Visual acuity: normal  Right visual field deficit: none  Left visual field deficit: none     CN III, IV, VI   Pupils are equal, round, and reactive to light.  Extraocular motions are normal.   Nystagmus: none   Diplopia: none  Ophthalmoparesis: none  Upgaze: normal  Downgaze: normal  Conjugate gaze: present    CN V   Facial sensation " intact.   Right corneal reflex: normal  Left corneal reflex: normal    CN VII   Right facial weakness: none  Left facial weakness: none    CN VIII   Hearing: intact    CN IX, X   Palate: symmetric  Right gag reflex: normal  Left gag reflex: normal    CN XI   Right sternocleidomastoid strength: normal  Left sternocleidomastoid strength: normal    CN XII   Tongue: not atrophic  Fasciculations: absent  Tongue deviation: none    Motor Exam   Muscle bulk: normal  Overall muscle tone: normal  Right arm tone: normal  Left arm tone: normal  Right leg tone: normal  Left leg tone: normal    Strength   Strength 5/5 throughout.     Sensory Exam   Right arm light touch: normal  Left arm light touch: normal  Right leg light touch: decreased from ankle  Left leg light touch: decreased from ankle  Right arm pinprick: normal  Left arm pinprick: normal  Right leg pinprick: decreased from ankle  Left leg pinprick: decreased from ankle    Gait, Coordination, and Reflexes     Gait  Gait: normal    Coordination   Finger to nose coordination: normal    Tremor   Resting tremor: absent  Intention tremor: absent  Action tremor: absent    Reflexes   Reflexes 2+ except as noted.   Right patellar: 0  Left patellar: 0  Right achilles: 0  Left achilles: 0          Physical Exam  Eyes:      Extraocular Movements: EOM normal.      Pupils: Pupils are equal, round, and reactive to light.   Neurological:      Mental Status: He is oriented to person, place, and time.      Motor: Motor strength is normal.     Coordination: Finger-Nose-Finger Test normal.      Gait: Gait is intact.      Deep Tendon Reflexes:      Reflex Scores:       Patellar reflexes are 0 on the right side and 0 on the left side.       Achilles reflexes are 0 on the right side and 0 on the left side.  Psychiatric:         Speech: Speech normal.        Result Review :    The following data was reviewed by: Trevor Myrick MD on 01/26/2024:  Common labs          11/29/2023    08:47    Common Labs   Glucose 105    BUN 7    Creatinine 0.94    Sodium 139    Potassium 4.3    Chloride 103    Calcium 9.4    Albumin 4.5    Total Bilirubin 0.3    Alkaline Phosphatase 114    AST (SGOT) 18    ALT (SGPT) 14    WBC 7.30    Hemoglobin 14.1    Hematocrit 41.8    Platelets 267    Total Cholesterol 151    Triglycerides 53    HDL Cholesterol 56    LDL Cholesterol  84    PSA 0.635                   Assessment and Plan     Diagnoses and all orders for this visit:    1. Peripheral polyneuropathy (Primary)  Assessment & Plan:  Continued Cymbalta.     Orders:  -     DULoxetine (Cymbalta) 30 MG capsule; Take 1 capsule by mouth 3 (Three) Times a Day.  Dispense: 270 capsule; Refill: 3             Follow Up     No follow-ups on file.  Patient was given instructions and counseling regarding his condition or for health maintenance advice. Please see specific information pulled into the AVS if appropriate.

## 2025-01-19 DIAGNOSIS — G62.9 PERIPHERAL POLYNEUROPATHY: Chronic | ICD-10-CM

## 2025-01-20 RX ORDER — DULOXETIN HYDROCHLORIDE 30 MG/1
30 CAPSULE, DELAYED RELEASE ORAL 3 TIMES DAILY
Qty: 270 CAPSULE | Refills: 0 | Status: SHIPPED | OUTPATIENT
Start: 2025-01-20

## 2025-01-20 NOTE — TELEPHONE ENCOUNTER
Rx Refill Note  Requested Prescriptions     Pending Prescriptions Disp Refills    DULoxetine (CYMBALTA) 30 MG capsule [Pharmacy Med Name: DULoxetine HCl Oral Capsule Delayed Release Particles 30 MG] 270 capsule 0     Sig: TAKE ONE CAPSULE BY MOUTH THREE TIMES DAILY      Last filled:  01/26/24 w/3  Last office visit with prescribing clinician: 1/26/2024      Next office visit with prescribing clinician: 1/29/2025     Pat Suggs MA  01/20/25, 08:35 EST

## 2025-01-21 ENCOUNTER — LAB (OUTPATIENT)
Dept: LAB | Facility: HOSPITAL | Age: 70
End: 2025-01-21
Payer: MEDICARE

## 2025-01-21 ENCOUNTER — OFFICE VISIT (OUTPATIENT)
Dept: FAMILY MEDICINE CLINIC | Facility: CLINIC | Age: 70
End: 2025-01-21
Payer: MEDICARE

## 2025-01-21 VITALS
BODY MASS INDEX: 34.46 KG/M2 | DIASTOLIC BLOOD PRESSURE: 76 MMHG | HEIGHT: 73 IN | WEIGHT: 260 LBS | SYSTOLIC BLOOD PRESSURE: 114 MMHG | OXYGEN SATURATION: 98 % | HEART RATE: 71 BPM

## 2025-01-21 DIAGNOSIS — Z13.0 SCREENING FOR DEFICIENCY ANEMIA: ICD-10-CM

## 2025-01-21 DIAGNOSIS — Z00.00 PREVENTATIVE HEALTH CARE: ICD-10-CM

## 2025-01-21 DIAGNOSIS — Z23 IMMUNIZATION DUE: ICD-10-CM

## 2025-01-21 DIAGNOSIS — Z12.5 SCREENING PSA (PROSTATE SPECIFIC ANTIGEN): ICD-10-CM

## 2025-01-21 DIAGNOSIS — Z13.89 SCREENING FOR BLOOD OR PROTEIN IN URINE: ICD-10-CM

## 2025-01-21 DIAGNOSIS — Z91.81 AT HIGH RISK FOR FALLS: ICD-10-CM

## 2025-01-21 DIAGNOSIS — Z13.6 SCREENING FOR CARDIOVASCULAR CONDITION: ICD-10-CM

## 2025-01-21 DIAGNOSIS — R73.01 FASTING HYPERGLYCEMIA: ICD-10-CM

## 2025-01-21 DIAGNOSIS — Z13.29 SCREENING FOR ENDOCRINE DISORDER: ICD-10-CM

## 2025-01-21 DIAGNOSIS — Z00.00 MEDICARE ANNUAL WELLNESS VISIT, SUBSEQUENT: Primary | ICD-10-CM

## 2025-01-21 DIAGNOSIS — Z13.220 SCREENING FOR HYPERLIPIDEMIA: ICD-10-CM

## 2025-01-21 DIAGNOSIS — H93.13 TINNITUS OF BOTH EARS: ICD-10-CM

## 2025-01-21 LAB
ALBUMIN SERPL-MCNC: 4.2 G/DL (ref 3.5–5.2)
ALBUMIN/GLOB SERPL: 1.3 G/DL
ALP SERPL-CCNC: 100 U/L (ref 39–117)
ALT SERPL W P-5'-P-CCNC: 16 U/L (ref 1–41)
ANION GAP SERPL CALCULATED.3IONS-SCNC: 10.6 MMOL/L (ref 5–15)
AST SERPL-CCNC: 24 U/L (ref 1–40)
BASOPHILS # BLD AUTO: 0.08 10*3/MM3 (ref 0–0.2)
BASOPHILS NFR BLD AUTO: 1.2 % (ref 0–1.5)
BILIRUB SERPL-MCNC: 0.3 MG/DL (ref 0–1.2)
BILIRUB UR QL STRIP: NEGATIVE
BUN SERPL-MCNC: 6 MG/DL (ref 8–23)
BUN/CREAT SERPL: 6.1 (ref 7–25)
CALCIUM SPEC-SCNC: 8.9 MG/DL (ref 8.6–10.5)
CHLORIDE SERPL-SCNC: 101 MMOL/L (ref 98–107)
CHOLEST SERPL-MCNC: 155 MG/DL (ref 0–200)
CLARITY UR: CLEAR
CO2 SERPL-SCNC: 25.4 MMOL/L (ref 22–29)
COLOR UR: ABNORMAL
CREAT SERPL-MCNC: 0.99 MG/DL (ref 0.76–1.27)
CRP SERPL-MCNC: 0.36 MG/DL (ref 0.01–0.5)
DEPRECATED RDW RBC AUTO: 44.1 FL (ref 37–54)
EGFRCR SERPLBLD CKD-EPI 2021: 83 ML/MIN/1.73
EOSINOPHIL # BLD AUTO: 0.37 10*3/MM3 (ref 0–0.4)
EOSINOPHIL NFR BLD AUTO: 5.6 % (ref 0.3–6.2)
ERYTHROCYTE [DISTWIDTH] IN BLOOD BY AUTOMATED COUNT: 12.3 % (ref 12.3–15.4)
GLOBULIN UR ELPH-MCNC: 3.2 GM/DL
GLUCOSE SERPL-MCNC: 93 MG/DL (ref 65–99)
GLUCOSE UR STRIP-MCNC: NEGATIVE MG/DL
HBA1C MFR BLD: 5.3 % (ref 4.8–5.6)
HCT VFR BLD AUTO: 43.2 % (ref 37.5–51)
HDLC SERPL-MCNC: 47 MG/DL (ref 40–60)
HGB BLD-MCNC: 14.8 G/DL (ref 13–17.7)
HGB UR QL STRIP.AUTO: NEGATIVE
IMM GRANULOCYTES # BLD AUTO: 0.01 10*3/MM3 (ref 0–0.05)
IMM GRANULOCYTES NFR BLD AUTO: 0.2 % (ref 0–0.5)
KETONES UR QL STRIP: ABNORMAL
LDLC SERPL CALC-MCNC: 91 MG/DL (ref 0–100)
LDLC/HDLC SERPL: 1.91 {RATIO}
LEUKOCYTE ESTERASE UR QL STRIP.AUTO: NEGATIVE
LYMPHOCYTES # BLD AUTO: 1.74 10*3/MM3 (ref 0.7–3.1)
LYMPHOCYTES NFR BLD AUTO: 26.4 % (ref 19.6–45.3)
MCH RBC QN AUTO: 33.2 PG (ref 26.6–33)
MCHC RBC AUTO-ENTMCNC: 34.3 G/DL (ref 31.5–35.7)
MCV RBC AUTO: 96.9 FL (ref 79–97)
MONOCYTES # BLD AUTO: 0.61 10*3/MM3 (ref 0.1–0.9)
MONOCYTES NFR BLD AUTO: 9.3 % (ref 5–12)
NEUTROPHILS NFR BLD AUTO: 3.77 10*3/MM3 (ref 1.7–7)
NEUTROPHILS NFR BLD AUTO: 57.3 % (ref 42.7–76)
NITRITE UR QL STRIP: NEGATIVE
NRBC BLD AUTO-RTO: 0 /100 WBC (ref 0–0.2)
PH UR STRIP.AUTO: 5.5 [PH] (ref 5–8)
PLATELET # BLD AUTO: 283 10*3/MM3 (ref 140–450)
PMV BLD AUTO: 10.5 FL (ref 6–12)
POTASSIUM SERPL-SCNC: 4.4 MMOL/L (ref 3.5–5.2)
PROT SERPL-MCNC: 7.4 G/DL (ref 6–8.5)
PROT UR QL STRIP: NEGATIVE
PSA SERPL-MCNC: 0.41 NG/ML (ref 0–4)
RBC # BLD AUTO: 4.46 10*6/MM3 (ref 4.14–5.8)
SODIUM SERPL-SCNC: 137 MMOL/L (ref 136–145)
SP GR UR STRIP: 1.02 (ref 1–1.03)
TRIGL SERPL-MCNC: 91 MG/DL (ref 0–150)
TSH SERPL DL<=0.05 MIU/L-ACNC: 1.67 UIU/ML (ref 0.27–4.2)
UROBILINOGEN UR QL STRIP: ABNORMAL
VLDLC SERPL-MCNC: 17 MG/DL (ref 5–40)
WBC NRBC COR # BLD AUTO: 6.58 10*3/MM3 (ref 3.4–10.8)

## 2025-01-21 PROCEDURE — 84443 ASSAY THYROID STIM HORMONE: CPT

## 2025-01-21 PROCEDURE — 80061 LIPID PANEL: CPT

## 2025-01-21 PROCEDURE — 83525 ASSAY OF INSULIN: CPT

## 2025-01-21 PROCEDURE — 85025 COMPLETE CBC W/AUTO DIFF WBC: CPT

## 2025-01-21 PROCEDURE — 83036 HEMOGLOBIN GLYCOSYLATED A1C: CPT

## 2025-01-21 PROCEDURE — 81003 URINALYSIS AUTO W/O SCOPE: CPT

## 2025-01-21 PROCEDURE — 86141 C-REACTIVE PROTEIN HS: CPT

## 2025-01-21 PROCEDURE — G0103 PSA SCREENING: HCPCS

## 2025-01-21 PROCEDURE — 80053 COMPREHEN METABOLIC PANEL: CPT

## 2025-01-21 NOTE — PATIENT INSTRUCTIONS
Advance Care Planning and Advance Directives     You make decisions on a daily basis - decisions about where you want to live, your career, your home, your life. Perhaps one of the most important decisions you face is your choice for future medical care. Take time to talk with your family and your healthcare team and start planning today.  Advance Care Planning is a process that can help you:  Understand possible future healthcare decisions in light of your own experiences  Reflect on those decision in light of your goals and values  Discuss your decisions with those closest to you and the healthcare professionals that care for you  Make a plan by creating a document that reflects your wishes    Surrogate Decision Maker  In the event of a medical emergency, which has left you unable to communicate or to make your own decisions, you would need someone to make decisions for you.  It is important to discuss your preferences for medical treatment with this person while you are in good health.     Qualities of a surrogate decision maker:  Willing to take on this role and responsibility  Knows what you want for future medical care  Willing to follow your wishes even if they don't agree with them  Able to make difficult medical decisions under stressful circumstances    Advance Directives  These are legal documents you can create that will guide your healthcare team and decision maker(s) when needed. These documents can be stored in the electronic medical record.    Living Will - a legal document to guide your care if you have a terminal condition or a serious illness and are unable to communicate. States vary by statute in document names/types, but most forms may include one or more of the following:        -  Directions regarding life-prolonging treatments        -  Directions regarding artificially provided nutrition/hydration        -  Choosing a healthcare decision maker        -  Direction regarding organ/tissue  donation    Durable Power of  for Healthcare - this document names an -in-fact to make medical decisions for you, but it may also allow this person to make personal and financial decisions for you. Please seek the advice of an  if you need this type of document.    **Advance Directives are not required and no one may discriminate against you if you do not sign one.    Medical Orders  Many states allow specific forms/orders signed by your physician to record your wishes for medical treatment in your current state of health. This form, signed in personal communication with your physician, addresses resuscitation and other medical interventions that you may or may not want.      For more information or to schedule a time with a [facility] Advance Care Planning Facilitator contact: [facility] or call [phone] and someone will contact you directly.  Fall Prevention in the Home, Adult  Falls can cause injuries and affect people of all ages. There are many simple things that you can do to make your home safe and to help prevent falls.  If you need it, ask for help making these changes.  What actions can I take to prevent falls?  General information  Use good lighting in all rooms. Make sure to:  Replace any light bulbs that burn out.  Turn on lights if it is dark and use night-lights.  Keep items that you use often in easy-to-reach places. Lower the shelves around your home if needed.  Move furniture so that there are clear paths around it.  Do not keep throw rugs or other things on the floor that can make you trip.  If any of your floors are uneven, fix them.  Add color or contrast paint or tape to clearly mario and help you see:  Grab bars or handrails.  First and last steps of staircases.  Where the edge of each step is.  If you use a ladder or stepladder:  Make sure that it is fully opened. Do not climb a closed ladder.  Make sure the sides of the ladder are locked in  place.  Have someone hold the ladder while you use it.  Know where your pets are as you move through your home.  What can I do in the bathroom?         Keep the floor dry. Clean up any water that is on the floor right away.  Remove soap buildup in the bathtub or shower. Buildup makes bathtubs and showers slippery.  Use non-skid mats or decals on the floor of the bathtub or shower.  Attach bath mats securely with double-sided, non-slip rug tape.  If you need to sit down while you are in the shower, use a non-slip stool.  Install grab bars by the toilet and in the bathtub and shower. Do not use towel bars as grab bars.  What can I do in the bedroom?  Make sure that you have a light by your bed that is easy to reach.  Do not use any sheets or blankets on your bed that hang to the floor.  Have a firm bench or chair with side arms that you can use for support when you get dressed.  What can I do in the kitchen?  Clean up any spills right away.  If you need to reach something above you, use a sturdy step stool that has a grab bar.  Keep electrical cables out of the way.  Do not use floor polish or wax that makes floors slippery.  What can I do with my stairs?  Do not leave anything on the stairs.  Make sure that you have a light switch at the top and the bottom of the stairs. Have them installed if you do not have them.  Make sure that there are handrails on both sides of the stairs. Fix handrails that are broken or loose. Make sure that handrails are as long as the staircases.  Install non-slip stair treads on all stairs in your home if they do not have carpet.  Avoid having throw rugs at the top or bottom of stairs, or secure the rugs with carpet tape to prevent them from moving.  Choose a carpet design that does not hide the edge of steps on the stairs. Make sure that carpet is firmly attached to the stairs. Fix any carpet that is loose or worn.  What can I do on the outside of my home?  Use bright outdoor  lighting.  Repair the edges of walkways and driveways and fix any cracks. Clear paths of anything that can make you trip, such as tools or rocks.  Add color or contrast paint or tape to clearly mario and help you see high doorway thresholds.  Trim any bushes or trees on the main path into your home.  Check that handrails are securely fastened and in good repair. Both sides of all steps should have handrails.  Install guardrails along the edges of any raised decks or porches.  Have leaves, snow, and ice cleared regularly. Use sand, salt, or ice melt on walkways during winter months if you live where there is ice and snow.  In the garage, clean up any spills right away, including grease or oil spills.  What other actions can I take?  Review your medicines with your health care provider. Some medicines can make you confused or feel dizzy. This can increase your chance of falling.  Wear closed-toe shoes that fit well and support your feet. Wear shoes that have rubber soles and low heels.  Use a cane, walker, scooter, or crutches that help you move around if needed.  Talk with your provider about other ways that you can decrease your risk of falls. This may include seeing a physical therapist to learn to do exercises to improve movement and strength.  Where to find more information  Centers for Disease Control and PreventionAYSE: cdc.gov  National [place] on Aging: isacc.nih.gov  National [place] on Aging: isacc.nih.gov  Contact a health care provider if:  You are afraid of falling at home.  You feel weak, drowsy, or dizzy at home.  You fall at home.  Get help right away if you:  Lose consciousness or have trouble moving after a fall.  Have a fall that causes a head injury.  These symptoms may be an emergency. Get help right away. Call 911.  Do not wait to see if the symptoms will go away.  Do not drive yourself to the hospital.  This information is not intended to replace advice given to you by your health care  provider. Make sure you discuss any questions you have with your health care provider.  Document Revised: 08/21/2023 Document Reviewed: 08/21/2023  Elsevier Patient Education © 2024 [employer]  Sit-to-Stand Exercise    The sit-to-stand exercise (also known as the chair stand or chair rise exercise) strengthens your lower body and helps you maintain or improve your mobility and independence. The end goal is to do the sit-to-stand exercise without using your hands. This will be easier as you become stronger. You should always talk with your health care provider before starting any exercise program, especially if you have had recent surgery.  Do the exercise exactly as told by your health care provider and adjust it as directed. It is normal to feel mild stretching, pulling, tightness, or discomfort as you do this exercise, but you should stop right away if you feel sudden pain or your pain gets worse. Do not begin doing this exercise until told by your health care provider.  What the sit-to-stand exercise does  The sit-to-stand exercise helps to strengthen the muscles in your thighs and the muscles in the center of your body that give you stability (core muscles). This exercise is especially helpful if:  You have had knee or hip surgery.  You have trouble getting up from a chair, out of a car, or off the toilet due to muscle weakness.  How to do the sit-to-stand exercise  Sit toward the front edge of a sturdy chair without armrests. Your knees should be bent and your feet should be flat on the floor and shoulder-width apart and underneath your hips.  Place your hands lightly on each side of the seat. Keep your back and neck as straight as possible, with your chest slightly forward.  Breathe in slowly. Lean forward and slightly shift your weight to the front of your feet.  Breathe out as you slowly stand up. Try not to support any weight with your hands.  Stand and pause for a full breath in and out.  Breathe in as  you sit down slowly. Tighten your core and abdominal muscles to control your lowering as much as possible. You should lower yourself back to the chair slowly, not just drop back into the seat.  Breathe out slowly.  Do this exercise 10-15 times. If needed, do it fewer times until you build up strength.  Rest for 1 minute, then do another set of 10-15 repetitions.  To change the difficulty of the sit-to-stand exercise  If the exercise is too difficult, use a chair with sturdy armrests, and push off the armrests to help you come to the standing position. You can also use the armrests to help slowly lower yourself back to sitting. As this gets easier, try to use your arms less. You can also place a firm cushion or pillow on the chair to make the surface higher.  If this exercise is too easy, do not use your arms to help raise or lower yourself. You can also wear a weighted vest, use hand weights, increase your repetitions, or try a lower chair.  General tips  You may feel tired when starting an exercise routine. This is normal.  You may have muscle soreness that lasts a few days. This is normal. As you get stronger, you may not feel muscle soreness.  Use smooth, steady movements.  Do not  hold your breath during strength exercises. This can cause unsafe changes in your blood pressure.  Breathe in slowly through your nose, and breathe out slowly through your mouth.  Summary  Strengthening your lower body is an important step to help you move safely and independently.  The sit-to-stand exercise helps strengthen the muscles in your thighs and core.  You should always talk with your health care provider before starting any exercise program, especially if you have had recent surgery.  This information is not intended to replace advice given to you by your health care provider. Make sure you discuss any questions you have with your health care provider.  Document Revised: 04/10/2022 Document Reviewed: 04/10/2022  [place]  Patient Education ©  [employer]  You are due for Shingrix vaccination series ( the newest shingles vaccine).  It is a two shot series spaced 2-6 months apart. Please get this vaccine series started at your earliest convenience at your local pharmacy to help avoid shingles outbreak. It is more effective than the old Zostavax vaccine and is recommended even if you have had the Zostavax vaccine in the past.  Once the Shingrix series is completed, it does not need to be repeated.   For more information, please look at the website below:  https://www.cdc.gov/vaccines/vpd/shingles/public/shingrix/index.html    You are due for a Covid 19 vaccination. (provides protection against Covid 19 Viral Infection) Please  talk to your pharmacist and get the immunization at your local pharmacy at your earliest convenience. Please click on the link for more information about this vaccine.   https://www.cdc.gov/coronavirus/2019-ncov/vaccines/stay-up-to-date.html        Medicare Wellness  Personal Prevention Plan of Service     Date of Office Visit:    Encounter Provider:  Hernan Hilton DO  Place of Service:  [facility] PRIMARY [facility]  Patient Name: Oits Loredo  :  1955    As part of the Medicare Wellness portion of your visit today, we are providing you with this personalized preventive plan of services (PPPS). This plan is based upon recommendations of the [place] Preventive Services Task Force (USPSTF) and the Advisory Committee on Immunization Practices (ACIP).    This lists the preventive care services that should be considered, and provides dates of when you are due. Items listed as completed are up-to-date and do not require any further intervention.    Health Maintenance   Topic Date Due   • COLORECTAL CANCER SCREENING  Never done   • ZOSTER VACCINE (1 of 2) Never done   • Pneumococcal Vaccine 65+ (1 of 1 - PCV) Never done   • LUNG CANCER SCREENING  2023   • INFLUENZA  VACCINE  07/01/2024   • COVID-19 Vaccine (1 - 2024-25 season) Never done   • ANNUAL WELLNESS VISIT  11/29/2024   • LIPID PANEL  11/29/2024   • BMI FOLLOWUP  11/29/2024   • TDAP/TD VACCINES (3 - Td or Tdap) 09/13/2032   • HEPATITIS C SCREENING  Completed   • AAA SCREEN ONCE  Completed       Orders Placed This Encounter   Procedures   • Fluzone High-Dose 65+yrs ([id number])         No follow-ups on file.

## 2025-01-22 LAB — INSULIN SERPL-ACNC: 12 UIU/ML (ref 2.6–24.9)

## 2025-01-29 ENCOUNTER — OFFICE VISIT (OUTPATIENT)
Dept: NEUROLOGY | Facility: CLINIC | Age: 70
End: 2025-01-29
Payer: MEDICARE

## 2025-01-29 VITALS
HEART RATE: 77 BPM | OXYGEN SATURATION: 97 % | BODY MASS INDEX: 34.33 KG/M2 | DIASTOLIC BLOOD PRESSURE: 68 MMHG | HEIGHT: 73 IN | WEIGHT: 259 LBS | SYSTOLIC BLOOD PRESSURE: 102 MMHG

## 2025-01-29 DIAGNOSIS — G62.9 PERIPHERAL POLYNEUROPATHY: Primary | Chronic | ICD-10-CM

## 2025-01-29 PROCEDURE — 1159F MED LIST DOCD IN RCRD: CPT | Performed by: PSYCHIATRY & NEUROLOGY

## 2025-01-29 PROCEDURE — 99213 OFFICE O/P EST LOW 20 MIN: CPT | Performed by: PSYCHIATRY & NEUROLOGY

## 2025-01-29 PROCEDURE — 1160F RVW MEDS BY RX/DR IN RCRD: CPT | Performed by: PSYCHIATRY & NEUROLOGY

## 2025-01-29 RX ORDER — DULOXETIN HYDROCHLORIDE 30 MG/1
30 CAPSULE, DELAYED RELEASE ORAL 3 TIMES DAILY
Qty: 270 CAPSULE | Refills: 3 | Status: SHIPPED | OUTPATIENT
Start: 2025-01-29

## 2025-01-29 NOTE — PROGRESS NOTES
"Chief Complaint    Peripheral polyneuropathy    Subjective        Otis Loredo presents to [facility] NEUROLOGY  History of Present Illness    70 y.o. male returns in follow up.  Last visit on 10/21/22 continued Cymbalta.      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  Requesting custom braces.      Cramping worsening in feet and legs.     N/T up to knees and started to bother hands.          Discomfort in feet 1-2/10.       Tolerating Cymbalta 30 mg TID      Timing when sitting or lying down at night.   Problem history:      Sx worsened in last 4 years.       Pain in heels bilaterally.  L > R.  Denies weakness.  Left foot 7 - 8/10.  Quality is throbbing.  Worse at end of the day.       Back of ankles feel tight.       Cymbalta decreases N/T.       Reviewed medical records:     Dx of peripheral neuropathy. Increased N/B/T in feet.  Father had neuropathy.       Cymbalta helping with foot pain.       Labs - 11/24/21 A1C 5.49; CBC,CMP - NCS      Objective   Vital Signs:  /68   Pulse 77   Ht 185.4 cm (72.99\")   Wt 117 kg (259 lb)   SpO2 97%   BMI 34.18 kg/m²   Estimated body mass index is 34.18 kg/m² as calculated from the following:    Height as of this encounter: 185.4 cm (72.99\").    Weight as of this encounter: 117 kg (259 lb).        Neurological Exam  Mental Status  Awake, alert and oriented to person, place and time. Oriented to person, place and time. Speech is normal. Language is fluent with no aphasia. Attention and concentration are normal. Fund of knowledge is appropriate for level of education.    Cranial Nerves  CN III, IV, VI: Extraocular movements intact bilaterally. Pupils equal round and reactive to light bilaterally.  CN V: Facial sensation is normal.  CN VII: Full and symmetric facial movement.  CN IX, X: Palate elevates symmetrically  CN XI: Shoulder shrug strength is normal.  CN XII: Tongue " midline without atrophy or fasciculations.    Motor   Strength is 5/5 throughout all four extremities.    Sensory  Light touch abnormality: Pinprick abnormality:   Decreased pp and lt stocking glove .    Reflexes  Deep tendon reflexes are 2+ and symmetric in all four extremities.    Coordination    Finger-to-nose, rapid alternating movements and heel-to-shin normal bilaterally without dysmetria.    Gait  Normal casual, toe, heel and tandem gait.       Physical Exam  Eyes:      Extraocular Movements: Extraocular movements intact.      Pupils: Pupils are equal, round, and reactive to light.   Neurological:      Motor: Motor strength is normal.     Coordination: Coordination is intact.      Deep Tendon Reflexes: Reflexes are normal and symmetric.   Psychiatric:         Speech: Speech normal.        Result Review :  The following data was reviewed by: Trevor Myrick MD on 01/29/2025:  Common labs          1/21/2025    09:57   Common Labs   Glucose 93    BUN 6    Creatinine 0.99    Sodium 137    Potassium 4.4    Chloride 101    Calcium 8.9    Albumin 4.2    Total Bilirubin 0.3    Alkaline Phosphatase 100    AST (SGOT) 24    ALT (SGPT) 16    WBC 6.58    Hemoglobin 14.8    Hematocrit 43.2    Platelets 283    Total Cholesterol 155    Triglycerides 91    HDL Cholesterol 47    LDL Cholesterol  91    Hemoglobin A1C 5.30    PSA 0.415                Assessment and Plan   Diagnoses and all orders for this visit:    1. Peripheral polyneuropathy (Primary)  -     Ambulatory Referral to Physical Therapy for Evaluation & Treatment  -     DULoxetine (CYMBALTA) 30 MG capsule; Take 1 capsule by mouth 3 (Three) Times a Day.  Dispense: 270 capsule; Refill: 3             Follow Up   No follow-ups on file.  Patient was given instructions and counseling regarding his condition or for health maintenance advice. Please see specific information pulled into the AVS if appropriate.

## 2025-02-10 NOTE — ASSESSMENT & PLAN NOTE
Orders:    Lipid Panel; Future    High Sensitivity CRP; Future    Hemoglobin A1c; Future    Comprehensive Metabolic Panel; Future    CBC & Differential; Future    Urinalysis With Microscopic If Indicated (No Culture) - Urine, Clean Catch; Future    TSH Rfx On Abnormal To Free T4; Future    PSA Screen; Future    Insulin, Total; Future

## 2025-02-10 NOTE — ASSESSMENT & PLAN NOTE
Orders:    Hemoglobin A1c; Future    Comprehensive Metabolic Panel; Future    Urinalysis With Microscopic If Indicated (No Culture) - Urine, Clean Catch; Future    Insulin, Total; Future

## 2025-02-10 NOTE — PROGRESS NOTES
Subjective   The ABCs of the Annual Wellness Visit  Medicare Wellness Visit      Otis Loredo is a 70 y.o. patient who presents for a Medicare Wellness Visit.    The following portions of the patient's history were reviewed and   updated as appropriate: allergies, current medications, past family history, past medical history, past social history, past surgical history, and problem list.    Compared to one year ago, the patient's physical   health is the same.  Compared to one year ago, the patient's mental   health is the same.    Recent Hospitalizations:  He was not admitted to the hospital during the last year.     Current Medical Providers:  Patient Care Team:  Hernan Hilton DO as PCP - General (Family Medicine)  Trevor Myrick MD as Consulting Physician (Neurology)    Outpatient Medications Prior to Visit   Medication Sig Dispense Refill    fluticasone (FLONASE) 50 MCG/ACT nasal spray 1 spray into the nostril(s) as directed by provider Daily. 3 bottle 3    Misc Natural Products (OSTEO BI-FLEX ADV DOUBLE ST) tablet Take 1 tablet by mouth daily.      Multiple Vitamin tablet Take 1 tablet by mouth Daily.      Omega-3 Fatty Acids (FISH OIL) 600 MG capsule Take  by mouth daily.      DULoxetine (CYMBALTA) 30 MG capsule TAKE ONE CAPSULE BY MOUTH THREE TIMES DAILY 270 capsule 0     No facility-administered medications prior to visit.     No opioid medication identified on active medication list. I have reviewed chart for other potential  high risk medication/s and harmful drug interactions in the elderly.      Aspirin is not on active medication list.  Aspirin use is not indicated based on review of current medical condition/s. Risk of harm outweighs potential benefits.  .    Patient Active Problem List   Diagnosis    Atopic rhinitis    Pes planus    Hemorrhoids    Adiposity    BPH associated with nocturia    Fam hx-ischem heart disease    Preventative health care    Plantar fasciitis    Fasting  "hyperglycemia    Tinnitus of both ears    Low back pain    Palpitations    Peripheral polyneuropathy    Dermatologic problem     Advance Care Planning Advance Directive is not on file.  ACP discussion was held with the patient during this visit. Patient does not have an advance directive, information provided.            Objective   Vitals:    25 0903   BP: 114/76   Pulse: 71   SpO2: 98%   Weight: 118 kg (260 lb)   Height: 185.4 cm (72.99\")   PainSc: 0-No pain       Estimated body mass index is 34.31 kg/m² as calculated from the following:    Height as of this encounter: 185.4 cm (72.99\").    Weight as of this encounter: 118 kg (260 lb).    BMI is >= 30 and <35. (Class 1 Obesity). The following options were offered after discussion;: weight loss educational material (shared in after visit summary)           Does the patient have evidence of cognitive impairment? No  Lab Results   Component Value Date    TRIG 91 2025    HDL 47 2025    LDL 91 2025    VLDL 17 2025    HGBA1C 5.30 2025                                                                                               Health  Risk Assessment    Smoking Status:  Social History     Tobacco Use   Smoking Status Former    Current packs/day: 0.00    Average packs/day: 1 pack/day for 42.0 years (42.0 ttl pk-yrs)    Types: Cigarettes    Start date: 1971    Quit date: 2013    Years since quittin.1    Passive exposure: Past   Smokeless Tobacco Never   Tobacco Comments    Uses electronic cigarette     Alcohol Consumption:  Social History     Substance and Sexual Activity   Alcohol Use Never       Fall Risk Screen  STEADI Fall Risk Assessment was completed, and patient is at HIGH risk for falls. Assessment completed on:2025    Depression Screening   Little interest or pleasure in doing things? Not at all   Feeling down, depressed, or hopeless? Not at all   PHQ-2 Total Score 0      Health Habits and Functional and " Cognitive Screenin/20/2025     8:47 PM   Functional & Cognitive Status   Do you have difficulty preparing food and eating? No    Do you have difficulty bathing yourself, getting dressed or grooming yourself? No    Do you have difficulty using the toilet? No    Do you have difficulty moving around from place to place? No    Do you have trouble with steps or getting out of a bed or a chair? No    Current Diet Well Balanced Diet    Dental Exam Up to date    Eye Exam Up to date    Exercise (times per week) 2 times per week    Current Exercises Include Cardiovascular Workout    Do you need help using the phone?  No    Are you deaf or do you have serious difficulty hearing?  No    Do you need help to go to places out of walking distance? No    Do you need help shopping? No    Do you need help preparing meals?  No    Do you need help with housework?  No    Do you need help with laundry? No    Do you need help taking your medications? No    Do you need help managing money? No    Do you ever drive or ride in a car without wearing a seat belt? No    Have you felt unusual stress, anger or loneliness in the last month? No    Who do you live with? Spouse    If you need help, do you have trouble finding someone available to you? No    Have you been bothered in the last four weeks by sexual problems? No    Do you have difficulty concentrating, remembering or making decisions? No        Patient-reported           Age-appropriate Screening Schedule:  Refer to the list below for future screening recommendations based on patient's age, sex and/or medical conditions. Orders for these recommended tests are listed in the plan section. The patient has been provided with a written plan.    Health Maintenance List  Health Maintenance   Topic Date Due    COLORECTAL CANCER SCREENING  Never done    Pneumococcal Vaccine 65+ (1 of 1 - PCV) Never done    ZOSTER VACCINE (1 of 2) Never done    LUNG CANCER SCREENING  2023    INFLUENZA  VACCINE  07/01/2024    COVID-19 Vaccine (1 - 2024-25 season) Never done    ANNUAL WELLNESS VISIT  11/29/2024    BMI FOLLOWUP  11/29/2024    LIPID PANEL  01/21/2026    TDAP/TD VACCINES (3 - Td or Tdap) 09/13/2032    HEPATITIS C SCREENING  Completed    AAA SCREEN ONCE  Completed                                                                                                                                                CMS Preventative Services Quick Reference  Risk Factors Identified During Encounter  Hearing Problem: Referral to ENT ordered    The above risks/problems have been discussed with the patient.  Pertinent information has been shared with the patient in the After Visit Summary.  An After Visit Summary and PPPS were made available to the patient.    Follow Up:   Next Medicare Wellness visit to be scheduled in 1 year.     Assessment & Plan  Medicare annual wellness visit, subsequent         Preventative health care    Orders:    Lipid Panel; Future    High Sensitivity CRP; Future    Hemoglobin A1c; Future    Comprehensive Metabolic Panel; Future    CBC & Differential; Future    Urinalysis With Microscopic If Indicated (No Culture) - Urine, Clean Catch; Future    TSH Rfx On Abnormal To Free T4; Future    PSA Screen; Future    Insulin, Total; Future    At high risk for falls         Immunization due    Orders:    Fluzone High-Dose 65+yrs (8273-4379)    Fasting hyperglycemia    Orders:    Hemoglobin A1c; Future    Comprehensive Metabolic Panel; Future    Urinalysis With Microscopic If Indicated (No Culture) - Urine, Clean Catch; Future    Insulin, Total; Future    Tinnitus of both ears  Recommend hearing screening         Screening for hyperlipidemia    Orders:    Lipid Panel; Future    Screening for cardiovascular condition    Orders:    High Sensitivity CRP; Future    Screening for endocrine disorder    Orders:    Hemoglobin A1c; Future    Comprehensive Metabolic Panel; Future    TSH Rfx On Abnormal To Free  T4; Future    Insulin, Total; Future    Screening for deficiency anemia    Orders:    CBC & Differential; Future    Screening for blood or protein in urine    Orders:    Urinalysis With Microscopic If Indicated (No Culture) - Urine, Clean Catch; Future    Screening PSA (prostate specific antigen)    Orders:    PSA Screen; Future       Problem List Items Addressed This Visit          ENT    Tinnitus of both ears    Current Assessment & Plan     Recommend hearing screening            Endocrine and Metabolic    Fasting hyperglycemia    Relevant Orders    Hemoglobin A1c (Completed)    Comprehensive Metabolic Panel (Completed)    Urinalysis With Microscopic If Indicated (No Culture) - Urine, Clean Catch (Completed)    Insulin, Total (Completed)       Health Encounters    Preventative health care    Relevant Orders    Lipid Panel (Completed)    High Sensitivity CRP (Completed)    Hemoglobin A1c (Completed)    Comprehensive Metabolic Panel (Completed)    CBC & Differential (Completed)    Urinalysis With Microscopic If Indicated (No Culture) - Urine, Clean Catch (Completed)    TSH Rfx On Abnormal To Free T4 (Completed)    PSA Screen (Completed)    Insulin, Total (Completed)     Other Visit Diagnoses       Medicare annual wellness visit, subsequent    -  Primary    At high risk for falls        Immunization due        Relevant Orders    Fluzone High-Dose 65+yrs (3631-4295)    Screening for hyperlipidemia        Relevant Orders    Lipid Panel (Completed)    Screening for cardiovascular condition        Relevant Orders    High Sensitivity CRP (Completed)    Screening for endocrine disorder        Relevant Orders    Hemoglobin A1c (Completed)    Comprehensive Metabolic Panel (Completed)    TSH Rfx On Abnormal To Free T4 (Completed)    Insulin, Total (Completed)    Screening for deficiency anemia        Relevant Orders    CBC & Differential (Completed)    Screening for blood or protein in urine        Relevant Orders     Urinalysis With Microscopic If Indicated (No Culture) - Urine, Clean Catch (Completed)    Screening PSA (prostate specific antigen)        Relevant Orders    PSA Screen (Completed)            The wellness exam has been reviewed in detail.  The patient has been fully counseled on preventative guidelines for vaccines, cancer screenings, and other health maintenance needs.  Functional testing has been performed to assess capacity for independent living and need for other medical interventions.   The patient was counseled on maintaining a lifestyle to promote good health and to minimize chronic diseases.  The patient has been assisted with scheduling healthcare procedures for the coming year and given a written document outlining these recommendations.    Return in about 6 months (around 7/21/2025) for with A1c;.      Follow Up:   Return in about 6 months (around 7/21/2025) for with A1c;.

## 2025-05-12 DIAGNOSIS — G62.9 PERIPHERAL POLYNEUROPATHY: Chronic | ICD-10-CM

## 2025-05-14 RX ORDER — DULOXETIN HYDROCHLORIDE 30 MG/1
30 CAPSULE, DELAYED RELEASE ORAL 3 TIMES DAILY
Qty: 270 CAPSULE | Refills: 3 | Status: SHIPPED | OUTPATIENT
Start: 2025-05-14

## 2025-05-14 NOTE — TELEPHONE ENCOUNTER
Rx Refill Note  Requested Prescriptions     Pending Prescriptions Disp Refills    DULoxetine (CYMBALTA) 30 MG capsule 270 capsule 3     Sig: Take 1 capsule by mouth 3 (Three) Times a Day.      Last filled: 1/29/25 270 with 3 refills.  Last office visit with prescribing clinician: 1/29/2025      Next office visit with prescribing clinician: 1/28/2026     Sent in 270 with 3 refills.    Alea Bland MA  05/14/25, 14:54 EDT

## 2025-07-14 ENCOUNTER — OFFICE VISIT (OUTPATIENT)
Dept: FAMILY MEDICINE CLINIC | Facility: CLINIC | Age: 70
End: 2025-07-14
Payer: MEDICARE

## 2025-07-14 VITALS
OXYGEN SATURATION: 99 % | HEART RATE: 73 BPM | WEIGHT: 248.6 LBS | HEIGHT: 73 IN | BODY MASS INDEX: 32.95 KG/M2 | DIASTOLIC BLOOD PRESSURE: 80 MMHG | SYSTOLIC BLOOD PRESSURE: 108 MMHG

## 2025-07-14 DIAGNOSIS — L98.491 SKIN ULCER, LIMITED TO BREAKDOWN OF SKIN: Primary | ICD-10-CM

## 2025-07-14 RX ORDER — CEPHALEXIN 500 MG/1
500 CAPSULE ORAL 2 TIMES DAILY
Qty: 14 CAPSULE | Refills: 0 | Status: SHIPPED | OUTPATIENT
Start: 2025-07-14 | End: 2025-07-21

## 2025-07-14 RX ORDER — MUPIROCIN 2 %
OINTMENT (GRAM) TOPICAL
Qty: 14 G | Refills: 1 | Status: SHIPPED | OUTPATIENT
Start: 2025-07-14

## 2025-07-22 NOTE — PROGRESS NOTES
Established Patient Office Visit      Patient Name: Otis Loredo  : 1955   MRN: [id number]   Care Team: Patient Care Team:  Hernan Hilton DO as PCP - General (Family Medicine)  Trevor Myrick MD as Consulting Physician (Neurology)    Chief Complaint:    Chief Complaint   Patient presents with    Wound     Started off as the size of a nickel, and now around the size of a golf ball. Pt states it is burning and itching       History of Present Illness: Otis Loredo is a 70 y.o. male who is here today for Wound (Started off as the size of a nickel, and now around the size of a golf ball. Pt states it is burning and itching).    HPI    History of Present Illness  The patient is a 70-year-old male who presents today for a skin lesion.    He reports the onset of a skin lesion, initially the size of a nickel, which has been causing discomfort due to tingling sensations and itching. The lesion appears to be spreading, and he experiences a burning sensation at times. He also mentions that he tends to scratch the area unconsciously. He does not believe dry skin is the cause of this issue. Additionally, he notes a similar sensation in his other leg, although without any visible signs. He frequently hits his shins against objects and gets cuts easily, which he attributes to aging and thinner skin. He expresses concern about the healing process of these wounds. At night, the pain bothers him more when he lies down. He maintains personal hygiene by showering daily and has been applying rubbing alcohol to the affected area. He also has cortisone and double antibiotic ointment at home.    Hobbies: Creek fishing       The following portions of the patient's history were reviewed and updated as appropriate: allergies, current medications, past family history, past medical history, past social history, past surgical history and problem list.    Subjective      Review of Systems:   Review of Systems - See  HPI    Past Medical History:   Past Medical History:   Diagnosis Date    Allergic seasonal    Allergic rhinitis Lifelong    Cholelithiasis     CTS (carpal tunnel syndrome) 2005    Improved with surgical repair    Difficulty walking     Flat foot Adulthood    Bilateral- improved with orthotic inserts     GERD (gastroesophageal reflux disease)     Hemorrhoids 2013    HL (hearing loss)     Low back pain     Obesity 2015    BMI 31 - body weight 225    Plantar fasciitis 2015    Severe symptoms relieved with new orthotics    Scarlet fever 1962    Varicose veins of both lower extremities Adulthood    Ankle irritability       Past Surgical History:   Past Surgical History:   Procedure Laterality Date    APPENDECTOMY  1975    CARPAL TUNNEL RELEASE Bilateral      Dr. Pacheco    CHOLECYSTECTOMY  1975    KNEE SURGERY      arthroscopic surgery    TONSILLECTOMY  1965    WRIST SURGERY      carpal tunnel       Family History:   Family History   Problem Relation Age of Onset    Heart attack Mother     Arthritis Mother     Breast cancer Mother     Hypertension Mother     Obesity Mother         Morbid    Stroke Mother     Sick sinus syndrome Mother         Pacemaker    Pneumonia Mother          age 79    Asthma Mother     Cancer Mother     Heart disease Mother     Migraines Mother     Seizures Mother     Allergies Father     Alzheimer's disease Father          age 84    Obesity Father         Morbid    Valvular heart disease Father     Asthma Father     Heart disease Father     Neuropathy Father     Diabetes Sister     Gout Sister     Obesity Sister         Morbid    Allergies Son     Hyperlipidemia Son     No Known Problems Brother     Migraines Sister     Diabetes Sister     Cancer Sister         breast cancer    Diabetes Sister        Social History:   Social History     Socioeconomic History    Marital status:    Tobacco Use    Smoking status: Former     Current packs/day: 0.00     Average packs/day: 1  "pack/day for 42.0 years (42.0 ttl pk-yrs)     Types: Cigarettes     Start date: 1971     Quit date: 2013     Years since quittin.5     Passive exposure: Past    Smokeless tobacco: Never    Tobacco comments:     Uses electronic cigarette   Vaping Use    Vaping status: Never Used   Substance and Sexual Activity    Alcohol use: Never    Drug use: Never    Sexual activity: Yes     Partners: Female     Birth control/protection: None       Tobacco History:   Social History     Tobacco Use   Smoking Status Former    Current packs/day: 0.00    Average packs/day: 1 pack/day for 42.0 years (42.0 ttl pk-yrs)    Types: Cigarettes    Start date: 1971    Quit date: 2013    Years since quittin.5    Passive exposure: Past   Smokeless Tobacco Never   Tobacco Comments    Uses electronic cigarette       Medications:   Outpatient Medications Prior to Visit   Medication Sig Dispense Refill    DULoxetine (CYMBALTA) 30 MG capsule Take 1 capsule by mouth 3 (Three) Times a Day. 270 capsule 3    fluticasone (FLONASE) 50 MCG/ACT nasal spray 1 spray into the nostril(s) as directed by provider Daily. 3 bottle 3    Misc Natural Products (OSTEO BI-FLEX ADV DOUBLE ST) tablet Take 1 tablet by mouth daily.      Multiple Vitamin tablet Take 1 tablet by mouth Daily.      Omega-3 Fatty Acids (FISH OIL) 600 MG capsule Take  by mouth daily.       No facility-administered medications prior to visit.        Allergies:   Allergies   Allergen Reactions    Nicoderm [Nicotine] Dermatitis       Objective   Objective     Physical Exam:  Vital Signs:   Vitals:    25 1109   BP: 108/80   Pulse: 73   SpO2: 99%   Weight: 113 kg (248 lb 9.6 oz)   Height: 185.4 cm (72.99\")     Body mass index is 32.81 kg/m².     Physical Exam  Nursing note reviewed  Const: NAD, A&Ox4, Pleasant, Cooperative  Eyes: EOMI, no conjunctivitis  ENT: No nasal discharge present, neck supple    Procedures/Radiology     Procedures  No radiology results for the last " 7 days     Assessment & Plan   Assessment / Plan      Assessment/Plan:   Problems Addressed This Visit  Diagnoses and all orders for this visit:    1. Skin ulcer, limited to breakdown of skin (Primary)  -     mupirocin (BACTROBAN) 2 % ointment; Apply 1 g topically daily to wound for 14 days or until healed  Dispense: 14 g; Refill: 1  -     cephalexin (KEFLEX) 500 MG capsule; Take 1 capsule by mouth 2 (Two) Times a Day for 7 days.  Dispense: 14 capsule; Refill: 0      Problem List Items Addressed This Visit    None  Visit Diagnoses         Skin ulcer, limited to breakdown of skin    -  Primary    Relevant Medications    mupirocin (BACTROBAN) 2 % ointment            New Medications Ordered This Visit   Medications    mupirocin (BACTROBAN) 2 % ointment     Sig: Apply 1 g topically daily to wound for 14 days or until healed     Dispense:  14 g     Refill:  1    cephalexin (KEFLEX) 500 MG capsule     Sig: Take 1 capsule by mouth 2 (Two) Times a Day for 7 days.     Dispense:  14 capsule     Refill:  0        Assessment & Plan  1. Skin lesion  - The skin lesion is likely due to peripheral neuropathy, resulting in a lack of sensation in the area.  - Physical examination revealed a lesion that is struggling to heal, with symptoms of tingling, burning, and itching.  - Advised against engaging in activities such as creek fishing until the wound has fully healed. Instructed to clean the wound daily with warm soapy water and apply the ointment once a day.  - Prescription for a topical ointment was provided to aid in the healing process. If the condition does not improve, contact the office.    Follow-up  - A video visit is scheduled for next week to monitor the progress of the wound.       There are no Patient Instructions on file for this visit.    Follow Up:   Return in about 1 week (around 7/21/2025) for video visit.    DO SARAH Machado RD  [facility]  [id number] [facility] [phone]  Fax [phone]  Phone [phone]    Patient or patient representative verbalized consent for the use of Ambient Listening during the visit with  Hernan Hilton DO for chart documentation. 7/22/2025 08:11 EDT     Disclaimer to patients: The 21st Century Cares Act makes medical notes like these available to patients in the interest of transparency. However, please be advised that this is still a medical document. It is intended as [id number] communication. Many sections may include medical language or jargon, abbreviations, and additional verbiage that are unfamiliar or confusing. In some ways it may come across as blunt, direct, or may be summarized in order to clearly and concisely communicate the most crucial information to medical professionals. It may also include mentions of conditions that are unlikely but considered as part of the differential diagnosis, including serious disorders. These are not always discussed at length at the time of appointment because their likelihood is so low, but may be included in a medical note to make it clear what has been considered and/or ruled out as part of a work-up. Medical documents are intended to carry relevant information, facts as evident, and the personal clinical opinion of the physician. If you have any questions regarding this medical document, please bring them to the attention of the physician at your next scheduled appointment.

## 2025-08-05 ENCOUNTER — OFFICE VISIT (OUTPATIENT)
Dept: FAMILY MEDICINE CLINIC | Facility: CLINIC | Age: 70
End: 2025-08-05
Payer: MEDICARE

## 2025-08-05 VITALS
BODY MASS INDEX: 32.42 KG/M2 | HEART RATE: 72 BPM | OXYGEN SATURATION: 98 % | SYSTOLIC BLOOD PRESSURE: 114 MMHG | HEIGHT: 73 IN | WEIGHT: 244.6 LBS | DIASTOLIC BLOOD PRESSURE: 72 MMHG

## 2025-08-05 DIAGNOSIS — L98.491 SKIN ULCER, LIMITED TO BREAKDOWN OF SKIN: Primary | ICD-10-CM

## 2025-08-05 PROCEDURE — 99214 OFFICE O/P EST MOD 30 MIN: CPT | Performed by: FAMILY MEDICINE

## 2025-08-05 PROCEDURE — G2211 COMPLEX E/M VISIT ADD ON: HCPCS | Performed by: FAMILY MEDICINE

## 2025-08-05 PROCEDURE — 1126F AMNT PAIN NOTED NONE PRSNT: CPT | Performed by: FAMILY MEDICINE

## 2025-08-05 RX ORDER — NYSTATIN AND TRIAMCINOLONE ACETONIDE 100000; 1 [USP'U]/G; MG/G
OINTMENT TOPICAL
Qty: 60 G | Refills: 0 | Status: SHIPPED | OUTPATIENT
Start: 2025-08-05